# Patient Record
Sex: MALE | Race: WHITE | NOT HISPANIC OR LATINO
[De-identification: names, ages, dates, MRNs, and addresses within clinical notes are randomized per-mention and may not be internally consistent; named-entity substitution may affect disease eponyms.]

---

## 2017-09-11 ENCOUNTER — APPOINTMENT (OUTPATIENT)
Dept: NEPHROLOGY | Facility: CLINIC | Age: 42
End: 2017-09-11
Payer: MEDICAID

## 2017-09-11 VITALS
HEART RATE: 70 BPM | DIASTOLIC BLOOD PRESSURE: 90 MMHG | WEIGHT: 240 LBS | HEIGHT: 66 IN | BODY MASS INDEX: 38.57 KG/M2 | SYSTOLIC BLOOD PRESSURE: 160 MMHG

## 2017-09-11 DIAGNOSIS — I10 ESSENTIAL (PRIMARY) HYPERTENSION: ICD-10-CM

## 2017-09-11 DIAGNOSIS — Z80.0 FAMILY HISTORY OF MALIGNANT NEOPLASM OF DIGESTIVE ORGANS: ICD-10-CM

## 2017-09-11 DIAGNOSIS — R80.9 PROTEINURIA, UNSPECIFIED: ICD-10-CM

## 2017-09-11 DIAGNOSIS — N18.9 CHRONIC KIDNEY DISEASE, UNSPECIFIED: ICD-10-CM

## 2017-09-11 DIAGNOSIS — Z83.3 FAMILY HISTORY OF DIABETES MELLITUS: ICD-10-CM

## 2017-09-11 DIAGNOSIS — F15.90 OTHER STIMULANT USE, UNSPECIFIED, UNCOMPLICATED: ICD-10-CM

## 2017-09-11 DIAGNOSIS — Z87.891 PERSONAL HISTORY OF NICOTINE DEPENDENCE: ICD-10-CM

## 2017-09-11 DIAGNOSIS — N17.9 ACUTE KIDNEY FAILURE, UNSPECIFIED: ICD-10-CM

## 2017-09-11 DIAGNOSIS — Z82.49 FAMILY HISTORY OF ISCHEMIC HEART DISEASE AND OTHER DISEASES OF THE CIRCULATORY SYSTEM: ICD-10-CM

## 2017-09-11 DIAGNOSIS — Z87.448 PERSONAL HISTORY OF OTHER DISEASES OF URINARY SYSTEM: ICD-10-CM

## 2017-09-11 PROCEDURE — 99204 OFFICE O/P NEW MOD 45 MIN: CPT

## 2017-09-11 RX ORDER — NICOTINE TRANSDERMAL SYSTEM 14 MG/24H
14 PATCH, EXTENDED RELEASE TRANSDERMAL
Qty: 7 | Refills: 0 | Status: ACTIVE | COMMUNITY
Start: 2017-06-11

## 2017-09-11 RX ORDER — ASPIRIN ENTERIC COATED TABLETS 81 MG 81 MG/1
81 TABLET, DELAYED RELEASE ORAL
Qty: 30 | Refills: 0 | Status: ACTIVE | COMMUNITY
Start: 2017-07-20

## 2017-09-11 RX ORDER — MINOXIDIL 2.5 MG/1
2.5 TABLET ORAL
Qty: 120 | Refills: 0 | Status: ACTIVE | COMMUNITY
Start: 2017-08-08

## 2017-09-11 RX ORDER — ATORVASTATIN CALCIUM 10 MG/1
10 TABLET, FILM COATED ORAL
Qty: 30 | Refills: 0 | Status: ACTIVE | COMMUNITY
Start: 2017-07-20

## 2017-09-11 RX ORDER — AMLODIPINE BESYLATE 10 MG/1
10 TABLET ORAL
Qty: 30 | Refills: 0 | Status: ACTIVE | COMMUNITY
Start: 2017-07-11

## 2017-09-11 RX ORDER — LABETALOL HYDROCHLORIDE 200 MG/1
200 TABLET, FILM COATED ORAL
Qty: 240 | Refills: 0 | Status: ACTIVE | COMMUNITY
Start: 2017-07-20

## 2017-09-11 RX ORDER — SPIRONOLACTONE 100 MG/1
100 TABLET ORAL
Qty: 90 | Refills: 0 | Status: DISCONTINUED | COMMUNITY
Start: 2017-06-11

## 2017-09-11 RX ORDER — FUROSEMIDE 40 MG/1
40 TABLET ORAL DAILY
Qty: 90 | Refills: 2 | Status: ACTIVE | COMMUNITY
Start: 2017-07-20

## 2017-09-11 RX ORDER — TERAZOSIN 5 MG/1
5 CAPSULE ORAL
Refills: 0 | Status: ACTIVE | COMMUNITY
Start: 2017-06-11

## 2017-09-11 RX ORDER — SPIRONOLACTONE 25 MG/1
25 TABLET ORAL
Qty: 30 | Refills: 0 | Status: ACTIVE | COMMUNITY
Start: 2017-08-10

## 2025-05-05 ENCOUNTER — APPOINTMENT (OUTPATIENT)
Dept: INTERNAL MEDICINE | Facility: CLINIC | Age: 50
End: 2025-05-05
Payer: COMMERCIAL

## 2025-05-05 ENCOUNTER — NON-APPOINTMENT (OUTPATIENT)
Age: 50
End: 2025-05-05

## 2025-05-05 ENCOUNTER — LABORATORY RESULT (OUTPATIENT)
Age: 50
End: 2025-05-05

## 2025-05-05 VITALS
HEART RATE: 88 BPM | HEIGHT: 66 IN | BODY MASS INDEX: 42.59 KG/M2 | DIASTOLIC BLOOD PRESSURE: 147 MMHG | TEMPERATURE: 97.7 F | OXYGEN SATURATION: 99 % | WEIGHT: 265 LBS | SYSTOLIC BLOOD PRESSURE: 213 MMHG

## 2025-05-05 VITALS — SYSTOLIC BLOOD PRESSURE: 223 MMHG | DIASTOLIC BLOOD PRESSURE: 147 MMHG

## 2025-05-05 VITALS — SYSTOLIC BLOOD PRESSURE: 192 MMHG | DIASTOLIC BLOOD PRESSURE: 108 MMHG

## 2025-05-05 DIAGNOSIS — Z00.00 ENCOUNTER FOR GENERAL ADULT MEDICAL EXAMINATION W/OUT ABNORMAL FINDINGS: ICD-10-CM

## 2025-05-05 DIAGNOSIS — R60.0 LOCALIZED EDEMA: ICD-10-CM

## 2025-05-05 DIAGNOSIS — F17.210 NICOTINE DEPENDENCE, CIGARETTES, UNCOMPLICATED: ICD-10-CM

## 2025-05-05 DIAGNOSIS — Z12.11 ENCOUNTER FOR SCREENING FOR MALIGNANT NEOPLASM OF COLON: ICD-10-CM

## 2025-05-05 DIAGNOSIS — I51.7 CARDIOMEGALY: ICD-10-CM

## 2025-05-05 DIAGNOSIS — R06.02 SHORTNESS OF BREATH: ICD-10-CM

## 2025-05-05 PROCEDURE — 99406 BEHAV CHNG SMOKING 3-10 MIN: CPT | Mod: 25

## 2025-05-05 PROCEDURE — 93000 ELECTROCARDIOGRAM COMPLETE: CPT

## 2025-05-05 PROCEDURE — 99203 OFFICE O/P NEW LOW 30 MIN: CPT | Mod: 25

## 2025-05-05 PROCEDURE — 99386 PREV VISIT NEW AGE 40-64: CPT

## 2025-05-05 RX ORDER — NIFEDIPINE 60 MG/1
60 TABLET, EXTENDED RELEASE ORAL
Qty: 180 | Refills: 3 | Status: ACTIVE | COMMUNITY
Start: 2025-05-05 | End: 1900-01-01

## 2025-05-05 RX ORDER — ATENOLOL 100 MG/1
100 TABLET ORAL
Qty: 90 | Refills: 1 | Status: ACTIVE | COMMUNITY
Start: 2025-05-05 | End: 1900-01-01

## 2025-05-05 RX ORDER — NIFEDIPINE 30 MG
1 TABLET, EXTENDED RELEASE 24 HR ORAL
Refills: 0 | DISCHARGE
Start: 2025-05-05

## 2025-05-05 RX ORDER — ASPIRIN 81 MG/1
81 TABLET, CHEWABLE ORAL
Qty: 90 | Refills: 3 | Status: ACTIVE | COMMUNITY
Start: 2025-05-05 | End: 1900-01-01

## 2025-05-05 RX ORDER — DOXAZOSIN 4 MG/1
4 TABLET ORAL
Qty: 30 | Refills: 0 | Status: ACTIVE | COMMUNITY
Start: 2025-05-05 | End: 1900-01-01

## 2025-05-09 LAB
25(OH)D3 SERPL-MCNC: 14 NG/ML
ALBUMIN SERPL ELPH-MCNC: 4 G/DL
ALP BLD-CCNC: 185 U/L
ALT SERPL-CCNC: 15 U/L
ANION GAP SERPL CALC-SCNC: 19 MMOL/L
APPEARANCE: CLEAR
AST SERPL-CCNC: 15 U/L
BASOPHILS # BLD AUTO: 0.05 K/UL
BASOPHILS NFR BLD AUTO: 0.5 %
BILIRUB SERPL-MCNC: <0.2 MG/DL
BILIRUBIN URINE: NEGATIVE
BLOOD URINE: ABNORMAL
BUN SERPL-MCNC: 76 MG/DL
CALCIUM SERPL-MCNC: 7.5 MG/DL
CHLORIDE SERPL-SCNC: 111 MMOL/L
CHOLEST SERPL-MCNC: 151 MG/DL
CO2 SERPL-SCNC: 15 MMOL/L
COLOR: YELLOW
CREAT SERPL-MCNC: 5.9 MG/DL
CREAT SPEC-SCNC: 24 MG/DL
EGFRCR SERPLBLD CKD-EPI 2021: 11 ML/MIN/1.73M2
EOSINOPHIL # BLD AUTO: 0.09 K/UL
EOSINOPHIL NFR BLD AUTO: 0.8 %
ESTIMATED AVERAGE GLUCOSE: 114 MG/DL
GLUCOSE QUALITATIVE U: 100 MG/DL
GLUCOSE SERPL-MCNC: 94 MG/DL
HBA1C MFR BLD HPLC: 5.6 %
HCT VFR BLD CALC: 35.7 %
HDLC SERPL-MCNC: 36 MG/DL
HGB BLD-MCNC: 10.9 G/DL
IMM GRANULOCYTES NFR BLD AUTO: 0.5 %
KETONES URINE: NEGATIVE MG/DL
LDLC SERPL-MCNC: 96 MG/DL
LEUKOCYTE ESTERASE URINE: NEGATIVE
LYMPHOCYTES # BLD AUTO: 1.89 K/UL
LYMPHOCYTES NFR BLD AUTO: 17.7 %
MAN DIFF?: NORMAL
MCHC RBC-ENTMCNC: 28.2 PG
MCHC RBC-ENTMCNC: 30.5 G/DL
MCV RBC AUTO: 92.2 FL
MICROALBUMIN 24H UR DL<=1MG/L-MCNC: 74 MG/DL
MICROALBUMIN/CREAT 24H UR-RTO: 3098 MG/G
MONOCYTES # BLD AUTO: 0.59 K/UL
MONOCYTES NFR BLD AUTO: 5.5 %
NEUTROPHILS # BLD AUTO: 7.99 K/UL
NEUTROPHILS NFR BLD AUTO: 75 %
NITRITE URINE: NEGATIVE
NONHDLC SERPL-MCNC: 115 MG/DL
NT-PROBNP SERPL-MCNC: 1298 PG/ML
PH URINE: 6.5
PLATELET # BLD AUTO: 198 K/UL
POTASSIUM SERPL-SCNC: 5.4 MMOL/L
PROT SERPL-MCNC: 6.8 G/DL
PROTEIN URINE: 100 MG/DL
PSA SERPL-MCNC: 0.33 NG/ML
RBC # BLD: 3.87 M/UL
RBC # FLD: 16.4 %
SODIUM SERPL-SCNC: 145 MMOL/L
SPECIFIC GRAVITY URINE: 1.01
TRIGL SERPL-MCNC: 100 MG/DL
TSH SERPL-ACNC: 2.71 UIU/ML
UROBILINOGEN URINE: 0.2 MG/DL
VIT B12 SERPL-MCNC: 582 PG/ML
WBC # FLD AUTO: 10.66 K/UL

## 2025-05-09 RX ORDER — SODIUM ZIRCONIUM CYCLOSILICATE 5 G/5G
5 POWDER, FOR SUSPENSION ORAL
Qty: 1 | Refills: 1 | Status: ACTIVE | COMMUNITY
Start: 2025-05-09 | End: 1900-01-01

## 2025-05-14 ENCOUNTER — APPOINTMENT (OUTPATIENT)
Dept: INTERNAL MEDICINE | Facility: CLINIC | Age: 50
End: 2025-05-14
Payer: COMMERCIAL

## 2025-05-14 VITALS
OXYGEN SATURATION: 99 % | SYSTOLIC BLOOD PRESSURE: 177 MMHG | BODY MASS INDEX: 42.27 KG/M2 | HEIGHT: 66 IN | WEIGHT: 263 LBS | TEMPERATURE: 97.7 F | HEART RATE: 74 BPM | DIASTOLIC BLOOD PRESSURE: 124 MMHG

## 2025-05-14 VITALS — SYSTOLIC BLOOD PRESSURE: 144 MMHG | DIASTOLIC BLOOD PRESSURE: 102 MMHG

## 2025-05-14 VITALS — SYSTOLIC BLOOD PRESSURE: 154 MMHG | DIASTOLIC BLOOD PRESSURE: 101 MMHG

## 2025-05-14 DIAGNOSIS — I10 ESSENTIAL (PRIMARY) HYPERTENSION: ICD-10-CM

## 2025-05-14 DIAGNOSIS — N18.9 CHRONIC KIDNEY DISEASE, UNSPECIFIED: ICD-10-CM

## 2025-05-14 DIAGNOSIS — D64.9 ANEMIA, UNSPECIFIED: ICD-10-CM

## 2025-05-14 PROCEDURE — 36415 COLL VENOUS BLD VENIPUNCTURE: CPT

## 2025-05-14 PROCEDURE — G2211 COMPLEX E/M VISIT ADD ON: CPT | Mod: NC

## 2025-05-14 PROCEDURE — 99214 OFFICE O/P EST MOD 30 MIN: CPT

## 2025-05-22 DIAGNOSIS — N18.6 END STAGE RENAL DISEASE: ICD-10-CM

## 2025-05-22 LAB
ANION GAP SERPL CALC-SCNC: 19 MMOL/L
BASOPHILS # BLD AUTO: 0.03 K/UL
BASOPHILS NFR BLD AUTO: 0.3 %
BUN SERPL-MCNC: 89 MG/DL
CALCIUM SERPL-MCNC: 7.5 MG/DL
CHLORIDE SERPL-SCNC: 108 MMOL/L
CO2 SERPL-SCNC: 15 MMOL/L
CREAT SERPL-MCNC: 6.91 MG/DL
EGFRCR SERPLBLD CKD-EPI 2021: 9 ML/MIN/1.73M2
EOSINOPHIL # BLD AUTO: 0.01 K/UL
EOSINOPHIL NFR BLD AUTO: 0.1 %
FERRITIN SERPL-MCNC: 228 NG/ML
GLUCOSE SERPL-MCNC: 87 MG/DL
HCT VFR BLD CALC: 33.7 %
HGB BLD-MCNC: 10.3 G/DL
IMM GRANULOCYTES NFR BLD AUTO: 0.6 %
IRON SATN MFR SERPL: 17 %
IRON SERPL-MCNC: 49 UG/DL
LYMPHOCYTES # BLD AUTO: 1.94 K/UL
LYMPHOCYTES NFR BLD AUTO: 17.4 %
MAN DIFF?: NORMAL
MCHC RBC-ENTMCNC: 27.9 PG
MCHC RBC-ENTMCNC: 30.6 G/DL
MCV RBC AUTO: 91.3 FL
MONOCYTES # BLD AUTO: 0.48 K/UL
MONOCYTES NFR BLD AUTO: 4.3 %
NEUTROPHILS # BLD AUTO: 8.64 K/UL
NEUTROPHILS NFR BLD AUTO: 77.3 %
PLATELET # BLD AUTO: 198 K/UL
POTASSIUM SERPL-SCNC: 5.5 MMOL/L
RBC # BLD: 3.69 M/UL
RBC # FLD: 15.9 %
SODIUM SERPL-SCNC: 142 MMOL/L
T(9;22)(ABL1,BCR)/CONTROL BLD/T: NORMAL
TIBC SERPL-MCNC: 289 UG/DL
UIBC SERPL-MCNC: 240 UG/DL
WBC # FLD AUTO: 11.17 K/UL

## 2025-06-19 ENCOUNTER — APPOINTMENT (OUTPATIENT)
Dept: RADIOLOGY | Facility: CLINIC | Age: 50
End: 2025-06-19
Payer: COMMERCIAL

## 2025-06-19 ENCOUNTER — APPOINTMENT (OUTPATIENT)
Dept: ULTRASOUND IMAGING | Facility: CLINIC | Age: 50
End: 2025-06-19
Payer: COMMERCIAL

## 2025-06-19 PROCEDURE — 93971 EXTREMITY STUDY: CPT | Mod: LT

## 2025-06-19 PROCEDURE — 71046 X-RAY EXAM CHEST 2 VIEWS: CPT

## 2025-06-23 ENCOUNTER — INPATIENT (INPATIENT)
Facility: HOSPITAL | Age: 50
LOS: 1 days | Discharge: HOME | DRG: 684 | End: 2025-06-25
Attending: STUDENT IN AN ORGANIZED HEALTH CARE EDUCATION/TRAINING PROGRAM | Admitting: STUDENT IN AN ORGANIZED HEALTH CARE EDUCATION/TRAINING PROGRAM
Payer: COMMERCIAL

## 2025-06-23 ENCOUNTER — NON-APPOINTMENT (OUTPATIENT)
Age: 50
End: 2025-06-23

## 2025-06-23 ENCOUNTER — APPOINTMENT (OUTPATIENT)
Dept: CARDIOLOGY | Facility: CLINIC | Age: 50
End: 2025-06-23
Payer: COMMERCIAL

## 2025-06-23 VITALS
DIASTOLIC BLOOD PRESSURE: 90 MMHG | WEIGHT: 270 LBS | HEART RATE: 115 BPM | BODY MASS INDEX: 43.58 KG/M2 | SYSTOLIC BLOOD PRESSURE: 140 MMHG | OXYGEN SATURATION: 97 %

## 2025-06-23 VITALS
WEIGHT: 270.07 LBS | TEMPERATURE: 97 F | HEART RATE: 116 BPM | HEIGHT: 66 IN | DIASTOLIC BLOOD PRESSURE: 96 MMHG | SYSTOLIC BLOOD PRESSURE: 153 MMHG | RESPIRATION RATE: 20 BRPM | OXYGEN SATURATION: 100 %

## 2025-06-23 DIAGNOSIS — E87.20 ACIDOSIS, UNSPECIFIED: ICD-10-CM

## 2025-06-23 DIAGNOSIS — Z98.890 OTHER SPECIFIED POSTPROCEDURAL STATES: Chronic | ICD-10-CM

## 2025-06-23 DIAGNOSIS — I10 ESSENTIAL (PRIMARY) HYPERTENSION: ICD-10-CM

## 2025-06-23 DIAGNOSIS — R60.0 LOCALIZED EDEMA: ICD-10-CM

## 2025-06-23 DIAGNOSIS — N17.9 ACUTE KIDNEY FAILURE, UNSPECIFIED: ICD-10-CM

## 2025-06-23 DIAGNOSIS — R06.09 OTHER FORMS OF DYSPNEA: ICD-10-CM

## 2025-06-23 DIAGNOSIS — E89.6 POSTPROCEDURAL ADRENOCORTICAL (-MEDULLARY) HYPOFUNCTION: Chronic | ICD-10-CM

## 2025-06-23 DIAGNOSIS — Z29.9 ENCOUNTER FOR PROPHYLACTIC MEASURES, UNSPECIFIED: ICD-10-CM

## 2025-06-23 LAB
ADD ON TEST-SPECIMEN IN LAB: SIGNIFICANT CHANGE UP
ALBUMIN SERPL ELPH-MCNC: 4.3 G/DL — SIGNIFICANT CHANGE UP (ref 3.3–5)
ALP SERPL-CCNC: 181 U/L — HIGH (ref 40–120)
ALT FLD-CCNC: 15 U/L — SIGNIFICANT CHANGE UP (ref 10–45)
ANION GAP SERPL CALC-SCNC: 20 MMOL/L — HIGH (ref 5–17)
APPEARANCE UR: CLEAR — SIGNIFICANT CHANGE UP
AST SERPL-CCNC: 13 U/L — SIGNIFICANT CHANGE UP (ref 10–40)
BACTERIA # UR AUTO: NEGATIVE /HPF — SIGNIFICANT CHANGE UP
BASOPHILS # BLD AUTO: 0.03 K/UL — SIGNIFICANT CHANGE UP (ref 0–0.2)
BASOPHILS NFR BLD AUTO: 0.3 % — SIGNIFICANT CHANGE UP (ref 0–2)
BILIRUB SERPL-MCNC: 0.2 MG/DL — SIGNIFICANT CHANGE UP (ref 0.2–1.2)
BILIRUB UR-MCNC: NEGATIVE — SIGNIFICANT CHANGE UP
BUN SERPL-MCNC: 95 MG/DL — HIGH (ref 7–23)
CALCIUM SERPL-MCNC: 7.6 MG/DL — LOW (ref 8.4–10.5)
CAST: 2 /LPF — SIGNIFICANT CHANGE UP (ref 0–4)
CHLORIDE SERPL-SCNC: 109 MMOL/L — HIGH (ref 96–108)
CO2 SERPL-SCNC: 14 MMOL/L — LOW (ref 22–31)
COLOR SPEC: YELLOW — SIGNIFICANT CHANGE UP
CREAT SERPL-MCNC: 7.12 MG/DL — HIGH (ref 0.5–1.3)
DIFF PNL FLD: ABNORMAL
EGFR: 9 ML/MIN/1.73M2 — LOW
EGFR: 9 ML/MIN/1.73M2 — LOW
EOSINOPHIL # BLD AUTO: 0.09 K/UL — SIGNIFICANT CHANGE UP (ref 0–0.5)
EOSINOPHIL NFR BLD AUTO: 1 % — SIGNIFICANT CHANGE UP (ref 0–6)
GAS PNL BLDV: SIGNIFICANT CHANGE UP
GLUCOSE SERPL-MCNC: 102 MG/DL — HIGH (ref 70–99)
GLUCOSE UR QL: 100 MG/DL
HCT VFR BLD CALC: 34 % — LOW (ref 39–50)
HGB BLD-MCNC: 10.5 G/DL — LOW (ref 13–17)
IMM GRANULOCYTES # BLD AUTO: 0.04 K/UL — SIGNIFICANT CHANGE UP (ref 0–0.07)
IMM GRANULOCYTES NFR BLD AUTO: 0.4 % — SIGNIFICANT CHANGE UP (ref 0–0.9)
KETONES UR QL: NEGATIVE MG/DL — SIGNIFICANT CHANGE UP
LEUKOCYTE ESTERASE UR-ACNC: NEGATIVE — SIGNIFICANT CHANGE UP
LIDOCAIN IGE QN: 165 U/L — HIGH (ref 7–60)
LYMPHOCYTES # BLD AUTO: 1.75 K/UL — SIGNIFICANT CHANGE UP (ref 1–3.3)
LYMPHOCYTES NFR BLD AUTO: 19.1 % — SIGNIFICANT CHANGE UP (ref 13–44)
MAGNESIUM SERPL-MCNC: 2 MG/DL — SIGNIFICANT CHANGE UP (ref 1.6–2.6)
MCHC RBC-ENTMCNC: 27.9 PG — SIGNIFICANT CHANGE UP (ref 27–34)
MCHC RBC-ENTMCNC: 30.9 G/DL — LOW (ref 32–36)
MCV RBC AUTO: 90.4 FL — SIGNIFICANT CHANGE UP (ref 80–100)
MONOCYTES # BLD AUTO: 0.73 K/UL — SIGNIFICANT CHANGE UP (ref 0–0.9)
MONOCYTES NFR BLD AUTO: 8 % — SIGNIFICANT CHANGE UP (ref 2–14)
NEUTROPHILS # BLD AUTO: 6.52 K/UL — SIGNIFICANT CHANGE UP (ref 1.8–7.4)
NEUTROPHILS NFR BLD AUTO: 71.2 % — SIGNIFICANT CHANGE UP (ref 43–77)
NITRITE UR-MCNC: NEGATIVE — SIGNIFICANT CHANGE UP
NRBC # BLD AUTO: 0 K/UL — SIGNIFICANT CHANGE UP (ref 0–0)
NRBC # FLD: 0 K/UL — SIGNIFICANT CHANGE UP (ref 0–0)
NRBC BLD AUTO-RTO: 0 /100 WBCS — SIGNIFICANT CHANGE UP (ref 0–0)
NT-PROBNP SERPL-SCNC: 1293 PG/ML — HIGH (ref 0–300)
PH UR: 5.5 — SIGNIFICANT CHANGE UP (ref 5–8)
PLATELET # BLD AUTO: 170 K/UL — SIGNIFICANT CHANGE UP (ref 150–400)
PMV BLD: 10.9 FL — SIGNIFICANT CHANGE UP (ref 7–13)
POTASSIUM SERPL-MCNC: 4.8 MMOL/L — SIGNIFICANT CHANGE UP (ref 3.5–5.3)
POTASSIUM SERPL-SCNC: 4.8 MMOL/L — SIGNIFICANT CHANGE UP (ref 3.5–5.3)
PROCALCITONIN SERPL-MCNC: 0.37 NG/ML — HIGH (ref 0.02–0.1)
PROT SERPL-MCNC: 7.7 G/DL — SIGNIFICANT CHANGE UP (ref 6–8.3)
PROT UR-MCNC: 100 MG/DL
RBC # BLD: 3.76 M/UL — LOW (ref 4.2–5.8)
RBC # FLD: 15.4 % — HIGH (ref 10.3–14.5)
RBC CASTS # UR COMP ASSIST: 0 /HPF — SIGNIFICANT CHANGE UP (ref 0–4)
SODIUM SERPL-SCNC: 143 MMOL/L — SIGNIFICANT CHANGE UP (ref 135–145)
SP GR SPEC: 1.01 — SIGNIFICANT CHANGE UP (ref 1–1.03)
SQUAMOUS # UR AUTO: 0 /HPF — SIGNIFICANT CHANGE UP (ref 0–5)
TROPONIN T, HIGH SENSITIVITY RESULT: 45 NG/L — SIGNIFICANT CHANGE UP (ref 0–51)
TROPONIN T, HIGH SENSITIVITY RESULT: 49 NG/L — SIGNIFICANT CHANGE UP (ref 0–51)
UROBILINOGEN FLD QL: 0.2 MG/DL — SIGNIFICANT CHANGE UP (ref 0.2–1)
WBC # BLD: 9.16 K/UL — SIGNIFICANT CHANGE UP (ref 3.8–10.5)
WBC # FLD AUTO: 9.16 K/UL — SIGNIFICANT CHANGE UP (ref 3.8–10.5)
WBC UR QL: 0 /HPF — SIGNIFICANT CHANGE UP (ref 0–5)

## 2025-06-23 PROCEDURE — G0537: CPT

## 2025-06-23 PROCEDURE — 76770 US EXAM ABDO BACK WALL COMP: CPT | Mod: 26

## 2025-06-23 PROCEDURE — 99401 PREV MED CNSL INDIV APPRX 15: CPT

## 2025-06-23 PROCEDURE — 71046 X-RAY EXAM CHEST 2 VIEWS: CPT | Mod: 26

## 2025-06-23 PROCEDURE — 99223 1ST HOSP IP/OBS HIGH 75: CPT | Mod: GC

## 2025-06-23 PROCEDURE — 99222 1ST HOSP IP/OBS MODERATE 55: CPT | Mod: GC

## 2025-06-23 PROCEDURE — 93000 ELECTROCARDIOGRAM COMPLETE: CPT

## 2025-06-23 PROCEDURE — 99205 OFFICE O/P NEW HI 60 MIN: CPT | Mod: 25

## 2025-06-23 PROCEDURE — 99285 EMERGENCY DEPT VISIT HI MDM: CPT

## 2025-06-23 RX ORDER — LABETALOL HYDROCHLORIDE 200 MG/1
5 TABLET, FILM COATED ORAL ONCE
Refills: 0 | Status: COMPLETED | OUTPATIENT
Start: 2025-06-23 | End: 2025-06-23

## 2025-06-23 RX ORDER — LISINOPRIL 30 MG/1
100 TABLET ORAL DAILY
Refills: 0 | Status: DISCONTINUED | OUTPATIENT
Start: 2025-06-23 | End: 2025-06-24

## 2025-06-23 RX ORDER — CITRIC ACID/SODIUM CITRATE 300-500 MG
60 SOLUTION, ORAL ORAL THREE TIMES A DAY
Refills: 0 | Status: DISCONTINUED | OUTPATIENT
Start: 2025-06-23 | End: 2025-06-25

## 2025-06-23 RX ORDER — ASPIRIN 325 MG
1 TABLET ORAL
Refills: 0 | DISCHARGE

## 2025-06-23 RX ORDER — CITRIC ACID/SODIUM CITRATE 300-500 MG
30 SOLUTION, ORAL ORAL THREE TIMES A DAY
Refills: 0 | Status: DISCONTINUED | OUTPATIENT
Start: 2025-06-23 | End: 2025-06-23

## 2025-06-23 RX ORDER — NIFEDIPINE 30 MG
60 TABLET, EXTENDED RELEASE 24 HR ORAL
Refills: 0 | Status: DISCONTINUED | OUTPATIENT
Start: 2025-06-23 | End: 2025-06-25

## 2025-06-23 RX ORDER — HEPARIN SODIUM 1000 [USP'U]/ML
5000 INJECTION INTRAVENOUS; SUBCUTANEOUS EVERY 8 HOURS
Refills: 0 | Status: DISCONTINUED | OUTPATIENT
Start: 2025-06-23 | End: 2025-06-25

## 2025-06-23 RX ORDER — NICOTINE POLACRILEX 4 MG/1
1 GUM, CHEWING ORAL ONCE
Refills: 0 | Status: COMPLETED | OUTPATIENT
Start: 2025-06-23 | End: 2025-06-23

## 2025-06-23 RX ORDER — SODIUM CHLORIDE 9 G/1000ML
500 INJECTION, SOLUTION INTRAVENOUS ONCE
Refills: 0 | Status: COMPLETED | OUTPATIENT
Start: 2025-06-23 | End: 2025-06-23

## 2025-06-23 RX ORDER — ACETAMINOPHEN 500 MG/5ML
650 LIQUID (ML) ORAL EVERY 6 HOURS
Refills: 0 | Status: DISCONTINUED | OUTPATIENT
Start: 2025-06-23 | End: 2025-06-25

## 2025-06-23 RX ADMIN — Medication 60 MILLILITER(S): at 23:36

## 2025-06-23 RX ADMIN — Medication 650 MILLIGRAM(S): at 17:28

## 2025-06-23 RX ADMIN — Medication 60 MILLIGRAM(S): at 20:02

## 2025-06-23 RX ADMIN — SODIUM CHLORIDE 500 MILLILITER(S): 9 INJECTION, SOLUTION INTRAVENOUS at 13:10

## 2025-06-23 RX ADMIN — Medication 60 MILLILITER(S): at 17:42

## 2025-06-23 NOTE — H&P ADULT - TIME BILLING
chart review; lab review; imaging review; discussion with patient and family about plan of care and updates

## 2025-06-23 NOTE — ED PROVIDER NOTE - OBJECTIVE STATEMENT
30-year-old male with history of chronic kidney disease sent in from his cardiologist Dr. Guillen for worsening left lower extremity swelling and redness.  Patient has been dealing with this now for several weeks.  States both legs are slightly swollen however left greater than right.  Had a negative duplex done on 6/19 as well as a normal chest x-ray that was performed at that time.  Patient was seen by his cardiologist today that recommended he come to the emergency department for further evaluation.  Denies any pain in the lower extremity swelling.  Redness and warmth have increased as well.  No fevers no chills.  Normal bowel bladder habits.  No runny nose sore throat or cough.  Patient denies any chest pain but is noting some exertional dyspnea.  Has recently discontinued off his Lasix after his creatinine increased and they have been working that up. 50-year-old male with history of chronic kidney disease sent in from his cardiologist Dr. Guillen for worsening left lower extremity swelling and redness.  Patient has been dealing with this now for several weeks.  States both legs are slightly swollen however left greater than right.  Had a negative duplex done on 6/19 as well as a normal chest x-ray that was performed at that time.  Patient was seen by his cardiologist today that recommended he come to the emergency department for further evaluation.  Denies any pain in the lower extremity swelling.  Redness and warmth have increased as well.  No fevers no chills.  Normal bowel bladder habits.  No runny nose sore throat or cough.  Patient denies any chest pain but is noting some exertional dyspnea.  Has recently discontinued off his Lasix after his creatinine increased and they have been working that up.

## 2025-06-23 NOTE — CONSULT NOTE ADULT - ATTENDING COMMENTS
# CKD stage V  Serum creatinine in the mid 4s in 2023. He stopped follow up with his nephrologist and medications. In April 2025, he went to PCP's office because of lower extremity swelling. Blood test showed that serum creatinine was 6. Now, serum creatinine is 7.  Please do US kidney and bladder.  Check urine protein:creat ratio and albumin:creatinine ratio.     # HTN  Patient has not been taking his BP meds because he did not have any symptoms. He started resuming nifedipine in April 2025 after seeing his PCP (Dr. Benjamin). He said that his SBP was in the 180s then.     # Metabolic acidosis  - secondary to CKD.   - Start bicitra 60m TID.    # LE swelling  - Chronic since APril 2025.  - US duplex of legs - DVT ruled out.   - Since patient does not have any shortness of breath, we can hold off diuretics for now. We want to monitor his kidney function first.     The rest of the recommendations as per fellow's note.    Estefani Montiel MD  Attending Nephrologist  [phone] or via [employer]

## 2025-06-23 NOTE — ED ADULT NURSE REASSESSMENT NOTE - NS ED NURSE REASSESS COMMENT FT1
attempted to call pharmacy for Bicitra medication. No answer from pharmacy. Will try again to obtain medication.

## 2025-06-23 NOTE — H&P ADULT - NSHPPHYSICALEXAM_GEN_ALL_CORE
T(C): 36.5 (06-23-25 @ 09:22), Max: 36.5 (06-23-25 @ 09:22)  HR: 105 (06-23-25 @ 09:22) (105 - 116)  BP: 146/98 (06-23-25 @ 09:22) (146/98 - 153/96)  RR: 19 (06-23-25 @ 09:22) (19 - 20)  SpO2: 98% (06-23-25 @ 09:22) (98% - 100%)    CONSTITUTIONAL: Well groomed, no apparent distress  EYES: PERRLA and symmetric, EOMI, No conjunctival or scleral injection, non-icteric  ENMT: Oral mucosa with moist membranes.              NECK: Supple, symmetric and without tracheal deviation   RESP: No respiratory distress, no use of accessory muscles; CTA b/l but decreased breath sounds (likely due to body habitus)  CV: RRR, +S1S2, no JVD; + peripheral edema (b/l LE edema)  GI: Soft, NT, ND, no rebound, no guarding;   SKIN: B/l erythema of LE   NEURO: CN II-XII intact;   PSYCH: Appropriate insight/judgment; A+O x 3, mood and affect appropriate, recent/remote memory intact

## 2025-06-23 NOTE — ED PROVIDER NOTE - CARE PLAN
Principal Discharge DX:	ROMAN (acute kidney injury)  Secondary Diagnosis:	H/O metabolic acidosis   1

## 2025-06-23 NOTE — H&P ADULT - HISTORY OF PRESENT ILLNESS
RL is a 50 year old M with PMH of Stage 4 CKD, malignant HTN, LV hypertrophy presents to [facility] referred by his cardiologist Dr. Guillen after pt's appointment today for acute on chronic worsening b/l lower extremity edema and erythema (L>R) and dyspnea on exertion. The LE swelling and erythema has been ongoing for 1 week but has acutely worsened in the past few days. He endorses tightness of b/l LE, dyspnea on exertion, sleeping sitting up (but due to shoulder surgery), and adequate urine production. He denies leg pain/parethesia, acute weight gain, chest pain, dizziness, n/v, and dysuria.    Of note he had self d/romelia most of his medication for a few months including Lasix. He has only been taking [id number], Atenolol 100mg and Nifedipine ER 60mg if BP >160/90. His home AM BP readings measure around 160/95.   Nephrologist (Dr. Juan LOPEZ) was last seen 1yr ago where his Cr was 4.1. He has a hx of L adrenal resection for HTN with renal bx (pathology results unknown by pt). BP improved after resection but pt remains on BP meds for BP control.     Patient presented to ED on 6/19/25 also for LE edema and erythema and dyspnea upon exertion. Obtained LLE Duplex US which showed no evidence of DVT.    In the ED, pt is afebrile, tachycardic (105-116), hypertensive (146/98), tachypneic (20), and satting @100% on RA. EKG showed sinus tachycardia. CXR showed clear lungs. Bladder and kidney US obtained. Pro-BNP elevated to 1200s. BUN: Cr was 95:7.12. VBG showed acidosis with pH of 7.17, bicarb 14, and AG to 20. UA showed proteinuria and glucosuria.    RL is a 50 year old M with PMH of Stage IV CKD, malignant HTN, LV hypertrophy presents to [facility] referred by his cardiologist Dr. Guillen after pt's appointment today for acute on chronic worsening b/l lower extremity edema and erythema (L>R) and dyspnea on exertion. The LE swelling and erythema has been ongoing for 1 week but has acutely worsened in the past few days. He endorses tightness of b/l LE, dyspnea on exertion, sleeping sitting up (but due to shoulder surgery), and adequate urine production. He denies leg pain/parethesia, acute weight gain, chest pain, dizziness, n/v, and dysuria.    Of note he had self d/romelia most of his medications for a few months including Lasix. He has only been taking [id number], Atenolol 100mg and Nifedipine ER 60mg if BP >160/90. His home AM BP readings measure around 160/95.   Nephrologist (Dr. Juan LOPEZ) was last seen 1yr ago where his Cr was 4.1. He has a hx of L adrenal resection for HTN with renal bx (pathology results unknown by pt). BP improved after resection but pt remains on BP meds for BP control.     Patient presented to ED on 6/19/25 also for LE edema and erythema and dyspnea upon exertion. Obtained LLE Duplex US which showed no evidence of DVT.    In the ED, pt is afebrile, tachycardic (105-116), hypertensive (146/98), tachypneic (20), and satting @100% on RA. EKG showed sinus tachycardia. CXR showed clear lungs. Bladder and kidney US obtained. Pro-BNP elevated to 1200s. BUN: Cr was 95:7.12. VBG showed acidosis with pH of 7.17, bicarb 14, and AG to 20. UA showed proteinuria and glucosuria.

## 2025-06-23 NOTE — H&P ADULT - PROBLEM SELECTOR PLAN 2
Dyspnea with exertion  - CXR clear  - No crackles on PE  - pro BNP 1293    Plan:  - TTE Dyspnea with exertion  - CXR clear  - No crackles on PE  - pro BNP 1293    Plan:  - F/u TTE

## 2025-06-23 NOTE — CONSULT NOTE ADULT - ASSESSMENT
50M with PMH of CKD, HTN sent for worsening swelling and redness and ROMNA on CKD.         ROMAN on CKD  -Cr in 2017 was 2.1. About one year ago was about 4 (stable for 2 years after ?adrenal surgery), 2 months ago was ~ 6. Pt used to follow with Dr. Abigail Martinez (nephrology) but stopped follow up and stopped taking all meds as felt good.   -Pt endorses nocturia x3/night but also drinks fluids at night.   -In ER labs significant for Cr 7 (6/23), BUN 95, SCO2 14, rest ok  -UA +prtoein  RECS:  -Obtain UPCR  -Obtain dedicated Renal Bladder US  -Start bicitra 60 ml TID  -No diuretics for now, strict I/O      Kalen Coello  Nephrology Fellow  Feel free to contact me on TEAMS  After 5 pm and on weekends please contact the on-call Fellow.   50M with PMH of CKD, HTN sent for worsening swelling and redness and ROMAN on CKD.         ROMAN on CKD  -Cr in 2017 was 2.1. About one year ago was about 4 (stable for 2 years after ?adrenal surgery), 2 months ago was ~ 6. Pt used to follow with Dr. Abigail Martinez (nephrology) but stopped follow up and stopped taking all meds as he felt fine.   -Pt endorses nocturia x3/night but also drinks fluids at night.   -In ER labs significant for Cr 7 (6/23), BUN 95, SCO2 14, rest ok  -UA +prtoein  RECS:  -Obtain UPCR  -Obtain dedicated Renal Bladder US  -Start bicitra 60 ml TID  -No diuretics for now, strict I/O      Kalen Coello  Nephrology Fellow  Feel free to contact me on TEAMS  After 5 pm and on weekends please contact the on-call Fellow.

## 2025-06-23 NOTE — H&P ADULT - ASSESSMENT
RL is a 50 year old M with PMH of Stage 4 CKD, malignant HTN, LV hypertrophy presents to [facility] referred by his cardiologist Dr. Guillen after pt's appointment today for acute on chronic worsening b/l lower extremity edema and erythema (L>R) and dyspnea on exertion. Labs showed elevated proBNP, AG metabolic acidosis, ROMAN on CKD, normal WBC. Presentation is concerning for CHF vs. cellulitis.  RL is a 50 year old M with PMH of Stage 4 CKD, malignant HTN, LV hypertrophy presents to [facility] referred by his cardiologist Dr. Guillen after pt's appointment today for acute on chronic worsening b/l lower extremity edema and erythema (L>R) and dyspnea on exertion. Labs showed elevated proBNP, AG metabolic acidosis, ROMAN on CKD, normal WBC. Presentation is concerning for CHF vs. cellulitis with ROMAN on CKD.

## 2025-06-23 NOTE — CONSULT NOTE ADULT - SUBJECTIVE AND OBJECTIVE BOX
[facility] DIVISION OF KIDNEY DISEASES AND HYPERTENSION -- [phone]  -- INITIAL CONSULT NOTE  --------------------------------------------------------------------------------  HPI:  50M with PMH of CKD, HTN sent for worsening swelling and redness and ROMAN on CKD.     Nephrology consulted for ROMAN on CKD    Baseline Cr 1 year ago was about 4 (stable for 2 years after ?adrenal surgery), 2 months ago was ~ 6. In 2017 Cr was 2.1 per HIE but no labs available in the interim. Pt used to follow with Dr. Abigail Martinez (nephrology) but stopped follow up and stopped taking all meds as felt good.     Pt endorses nocturia x3/night but also drinks fluids at night.     Denies any shortness of breath, chest pain, nausea, vomiting, abdominal pain, diarrhea.    In ER labs significant for Cr 7 (6/23), BUN 95, SCO2 14, rest ok    PAST HISTORY  --------------------------------------------------------------------------------  PAST MEDICAL & SURGICAL HISTORY:    FAMILY HISTORY:    PAST SOCIAL HISTORY:    ALLERGIES & MEDICATIONS  --------------------------------------------------------------------------------  Allergies    No Known Allergies    Intolerances      Standing Inpatient Medications  citric acid/sodium citrate Solution 30 milliLiter(s) Oral three times a day    PRN Inpatient Medications  acetaminophen     Tablet .. 650 milliGRAM(s) Oral every 6 hours PRN      REVIEW OF SYSTEMS  --------------------------------------------------------------------------------    All other systems were reviewed and are negative, except as noted.    VITALS/PHYSICAL EXAM  --------------------------------------------------------------------------------  T(C): 36.5 (06-23-25 @ 09:22), Max: 36.5 (06-23-25 @ 09:22)  HR: 105 (06-23-25 @ 09:22) (105 - 116)  BP: 146/98 (06-23-25 @ 09:22) (146/98 - 153/96)  RR: 19 (06-23-25 @ 09:22) (19 - 20)  SpO2: 98% (06-23-25 @ 09:22) (98% - 100%)  Wt(kg): --  Height (cm): 167.6 (06-23-25 @ 08:53)  Weight (kg): 122.5 (06-23-25 @ 08:53)  BMI (kg/m2): 43.6 (06-23-25 @ 08:53)  BSA (m2): 2.27 (06-23-25 @ 08:53)        Physical Exam:  	Gen: NAD  	HEENT: Anicteric  	Pulm: CTA B/L  	CV: S1S2+  	Abd: Soft, +BS    	Ext: erythema and LE edema B/L  	Neuro: Awake  	Skin: Warm and dry    LABS/STUDIES  --------------------------------------------------------------------------------              10.5   9.16  >-----------<  170      [06-23-25 @ 10:19]              34.0     143  |  109  |  95  ----------------------------<  102      [06-23-25 @ 10:19]  4.8   |  14  |  7.12        Ca     7.6     [06-23-25 @ 10:19]      Mg     2.0     [06-23-25 @ 10:19]    TPro  7.7  /  Alb  4.3  /  TBili  0.2  /  DBili  x   /  AST  13  /  ALT  15  /  AlkPhos  181  [06-23-25 @ 10:19]          Creatinine Trend:  SCr 7.12 [06-23 @ 10:19]    Urinalysis - [06-23-25 @ 10:55]      Color Yellow / Appearance Clear / SG 1.009 / pH 5.5      Gluc 100 / Ketone   / Bili Negative / Urobili 0.2       Blood Trace / Protein 100 / Leuk Est Negative / Nitrite Negative      RBC 0 / WBC 0 / Hyaline  / Gran  / Sq Epi  / Non Sq Epi 0 / Bacteria Negative          Tacrolimus  Cyclosporine  Sirolimus  Mycophenolate  BK PCR  CMV PCR  Parvo PCR  EBV PCR [facility] DIVISION OF KIDNEY DISEASES AND HYPERTENSION -- [phone]  -- INITIAL CONSULT NOTE  --------------------------------------------------------------------------------  HPI:  50M with PMH of CKD, HTN sent for worsening swelling and redness and ROMAN on CKD.     Nephrology consulted for ROMAN on CKD    Baseline Cr about 1 year ago was about 4 (stable for 2 years after ?adrenal surgery), 2 months ago was ~ 6. In 2017 Cr was 2.1 per HIE but no labs available in the interim. Pt used to follow with Dr. Abigail Martinez (nephrology) but stopped follow up and stopped taking all meds as felt good.     Pt endorses nocturia x3/night but also drinks fluids at night.     Denies any shortness of breath, chest pain, nausea, vomiting, abdominal pain, diarrhea.    In ER labs significant for Cr 7 (6/23), BUN 95, SCO2 14, rest ok    PAST HISTORY  --------------------------------------------------------------------------------  PAST MEDICAL & SURGICAL HISTORY:    FAMILY HISTORY:    PAST SOCIAL HISTORY: lives at home    ALLERGIES & MEDICATIONS  --------------------------------------------------------------------------------  Allergies    No Known Allergies    Intolerances      Standing Inpatient Medications  citric acid/sodium citrate Solution 30 milliLiter(s) Oral three times a day    PRN Inpatient Medications  acetaminophen     Tablet .. 650 milliGRAM(s) Oral every 6 hours PRN      REVIEW OF SYSTEMS  --------------------------------------------------------------------------------    All other systems were reviewed and are negative, except as noted.    VITALS/PHYSICAL EXAM  --------------------------------------------------------------------------------  T(C): 36.5 (06-23-25 @ 09:22), Max: 36.5 (06-23-25 @ 09:22)  HR: 105 (06-23-25 @ 09:22) (105 - 116)  BP: 146/98 (06-23-25 @ 09:22) (146/98 - 153/96)  RR: 19 (06-23-25 @ 09:22) (19 - 20)  SpO2: 98% (06-23-25 @ 09:22) (98% - 100%)  Wt(kg): --  Height (cm): 167.6 (06-23-25 @ 08:53)  Weight (kg): 122.5 (06-23-25 @ 08:53)  BMI (kg/m2): 43.6 (06-23-25 @ 08:53)  BSA (m2): 2.27 (06-23-25 @ 08:53)        Physical Exam:  	Gen: NAD  	HEENT: Anicteric  	Pulm: CTA B/L  	CV: S1S2+  	Abd: Soft, +BS    	Ext: erythema and LE edema B/L  	Neuro: Awake  	Skin: Warm and dry    LABS/STUDIES  --------------------------------------------------------------------------------              10.5   9.16  >-----------<  170      [06-23-25 @ 10:19]              34.0     143  |  109  |  95  ----------------------------<  102      [06-23-25 @ 10:19]  4.8   |  14  |  7.12        Ca     7.6     [06-23-25 @ 10:19]      Mg     2.0     [06-23-25 @ 10:19]    TPro  7.7  /  Alb  4.3  /  TBili  0.2  /  DBili  x   /  AST  13  /  ALT  15  /  AlkPhos  181  [06-23-25 @ 10:19]          Creatinine Trend:  SCr 7.12 [06-23 @ 10:19]    Urinalysis - [06-23-25 @ 10:55]      Color Yellow / Appearance Clear / SG 1.009 / pH 5.5      Gluc 100 / Ketone   / Bili Negative / Urobili 0.2       Blood Trace / Protein 100 / Leuk Est Negative / Nitrite Negative      RBC 0 / WBC 0 / Hyaline  / Gran  / Sq Epi  / Non Sq Epi 0 / Bacteria Negative          Tacrolimus  Cyclosporine  Sirolimus  Mycophenolate  BK PCR  CMV PCR  Parvo PCR  EBV PCR

## 2025-06-23 NOTE — ED PROVIDER NOTE - ATTENDING APP SHARED VISIT CONTRIBUTION OF CARE
I, Hernandez Cee MD, Emergency Medicine Attending Physician, personally saw and examined the patient and I personally made/approve the management plan and take responsibility for the patient management.    MDM: 50-year-old male with history of CKD, who presents with left lower extremity swelling and redness.  Patient was sent in by his cardiologist.  Patient has already obtained imaging including duplex ultrasound on 6/19/2025 that showed no evidence of DVT of the left lower extremity.  Patient states he has baseline shortness of breath with exertion and with lying flat which has been present for 2 months.  Patient also with history of CKD, last creatinine of 6, no longer on Lasix 80 mg.  Denies any chest pain or pleuritic symptoms.    ROS: denies trauma, fever, chills, chest pain, abdominal pain, nausea, vomiting, numbness, weakness    On examination, patient with elevated heart rate and blood pressure but otherwise stable vitals, well-appearing, in no acute distress. Cardiac examination RRR, lungs CTAB with no W/R/R.  ABD: Abdomen soft, non-tender and non-distended, no rebound, no guarding, no rigidity. No CVA tenderness.  EXT: (+) Erythema to the anterior aspect of the shin with minimal warmth, nontender, no induration or fluctuance.  There is no calf tenderness. Negative Hanny's sign..  Neurovascularly intact in all 4 extremities.     Will obtain labs to evaluate for hematologic disorder, metabolic derangements, hepatic and renal function, and screen for infection.  Will keep patient on cardiac monitor, obtain EKG and troponin to evaluate for possible cardiac abnormality including ACS and arrhythmia.  Will obtain chest x-ray to evaluate for acute cardiopulmonary pathology.    My independent interpretation of the EKG shows:  Sinus tachycardia with rate of 101 bpm, normal axis, no ST elevations or depressions.  QTc prolonged at 492 I, Hernandez Cee MD, Emergency Medicine Attending Physician, personally saw and examined the patient and I personally made/approve the management plan and take responsibility for the patient management.    MDM: 50-year-old male with history of CKD, who presents with left lower extremity swelling and redness.  Patient was sent in by his cardiologist.  Patient has already obtained imaging including duplex ultrasound on 6/19/2025 that showed no evidence of DVT of the left lower extremity.  Patient states he has baseline shortness of breath with exertion and with lying flat which has been present for 2 months.  Patient also with history of CKD, last creatinine of 6, no longer on Lasix 80 mg.  Denies any chest pain or pleuritic symptoms.    ROS: denies trauma, fever, chills, chest pain, abdominal pain, nausea, vomiting, numbness, weakness    On examination, patient with elevated heart rate and blood pressure but otherwise stable vitals, well-appearing, in no acute distress. Cardiac examination RRR, lungs CTAB with no W/R/R.  ABD: Abdomen soft, non-tender and non-distended, no rebound, no guarding, no rigidity. No CVA tenderness.  EXT: (+) Erythema to the anterior aspect of the shin with minimal warmth, nontender, no induration or fluctuance.  There is no calf tenderness. Negative Hanny's sign..  Neurovascularly intact in all 4 extremities.     Will obtain labs to evaluate for hematologic disorder, metabolic derangements, hepatic and renal function, and screen for infection.  Will keep patient on cardiac monitor, obtain EKG and troponin to evaluate for possible cardiac abnormality including ACS and arrhythmia.  Will obtain chest x-ray to evaluate for acute cardiopulmonary pathology.    My independent interpretation of the EKG shows:  Sinus tachycardia with rate of 101 bpm, normal axis, no ST elevations or depressions.  QTc prolonged at 492    Labs reviewed: CBC showed no leukocytosis, mild anemia. Patient significantly acidotic with pH 7.17 and bicarb 14, with ROMAN on CKD.  Troponin indeterminate range, will obtain repeat.  Elevated proBNP.  Chest x-ray with clear lungs.  Patient was seen by nephrology who recommended Bicitra.  The patient will need to be admitted to the hospital for continued evaluation and management.  Discussed with the accepting physician regarding the initial presentation, diagnostic studies, treatments given in the ED, and current plan of care.  The patient was accepted by and endorsed to the medicine team nephrology consultation.

## 2025-06-23 NOTE — H&P ADULT - NSICDXPASTSURGICALHX_GEN_ALL_CORE_FT
PAST SURGICAL HISTORY:  H/O shoulder surgery     H/O total adrenalectomy     History of carpal tunnel surgery

## 2025-06-23 NOTE — ED ADULT TRIAGE NOTE - CHIEF COMPLAINT QUOTE
Pt c/o "swelling to left leg. Sent by cardiologist due swelling  and PMH elevated Creatine levels. No SOB/CP"

## 2025-06-23 NOTE — ED PROVIDER NOTE - WR ORDER ID 1
Hello,    Please call the following patient with the results below. Thank you!    Miguel Phelps,    I hope you're well. I wanted to communicate with you the results of the tests that we did.     The laboratory results show NO COVID. Please let me know if you have any other questions or concerns.     Thank you!  Jacqueline Mishra MD PGY3   [id number]

## 2025-06-23 NOTE — H&P ADULT - PROBLEM SELECTOR PLAN 1
B/l LE edema and erythema ongoing for a few weeks with acute worsening in the past few days 2 chronic l/l LE edema and erythema since 04/25 with acute worsening in the past few days.    Plan:  - Nephro consulted. Appreciate recs:     >Can hold off on diuretics since no SOB. Monitor renal function first.

## 2025-06-23 NOTE — H&P ADULT - NSHPLABSRESULTS_GEN_ALL_CORE
10.5   9.16  )-----------( 170      ( 23 Jun 2025 10:19 )             34.0   06-23    143  |  109[H]  |  95[H]  ----------------------------<  102[H]  4.8   |  14[L]  |  7.12[H]    Ca    7.6[L]      23 Jun 2025 10:19  Mg     2.0     06-23    TPro  7.7  /  Alb  4.3  /  TBili  0.2  /  DBili  x   /  AST  13  /  ALT  15  /  AlkPhos  181[H]  06-23    CAPILLARY BLOOD GLUCOSE    PROCEDURE DATE:  06/23/2025          INTERPRETATION:  EXAMINATION: XR CHEST PA AND LATERAL    CLINICAL INDICATION: Chest Pain    TECHNIQUE: 2 views; Frontal and lateral views of the chest were obtained.    COMPARISON: 6/19/2025.    FINDINGS:  The heart is normal in size.  The lungs are clear.  There is no pneumothorax or pleural effusion.    IMPRESSION:  Clear lungs.    --- End of Report ---          LAITH CASTILLO MD; Resident Radiologist  This document has been electronically signed.  SAMIR BLACKWELL MD; Attending Radiologist  This document has been electronically signed. Jun 23 2025 12:46PM 10.5   9.16  )-----------( 170      ( 23 Jun 2025 10:19 )             34.0   06-23    143  |  109[H]  |  95[H]  ----------------------------<  102[H]  4.8   |  14[L]  |  7.12[H]    Ca    7.6[L]      23 Jun 2025 10:19  Mg     2.0     06-23    TPro  7.7  /  Alb  4.3  /  TBili  0.2  /  DBili  x   /  AST  13  /  ALT  15  /  AlkPhos  181[H]  06-23    CAPILLARY BLOOD GLUCOSE    PROCEDURE DATE:  06/23/2025          INTERPRETATION:  EXAMINATION: XR CHEST PA AND LATERAL    CLINICAL INDICATION: Chest Pain    TECHNIQUE: 2 views; Frontal and lateral views of the chest were obtained.    COMPARISON: 6/19/2025.    FINDINGS:  The heart is normal in size.  The lungs are clear.  There is no pneumothorax or pleural effusion.    IMPRESSION:  Clear lungs.    --- End of Report ---          LAITH CASTILLO MD; Resident Radiologist  This document has been electronically signed.  SAMIR BLACKWELL MD; Attending Radiologist  This document has been electronically signed. Jun 23 2025 12:46PM    ACC: [id number] EXAM: US KIDNEYS AND BLADDER ORDERED BY: SYMONE SWANSON    PROCEDURE DATE: 06/23/2025        INTERPRETATION: CLINICAL INFORMATION: 50 year old male with chronic kidney disease and acute kidney injury.    COMPARISON: Noncontrast CT abdomen pelvis 5/5/2008    TECHNIQUE: Sonography of the kidneys and bladder.    FINDINGS:  Right kidney: 8.4 cm. Markedly increased parenchymal echogenicity. Mild pelvic fullness. No hydronephrosis. 0.4 x 0.6 x 0.7 cm interpolar cyst.    Left kidney: 7.9 cm. Markedly increased parenchymal echogenicity. No hydronephrosis. 0.8 x 0.9 x 0.9 cm lower pole cyst with low-level internal echoes. 1.0 x 1.0 x 1.8 cm upper pole cyst with low-level internal echoes.    Urinary bladder: Within normal limits.    IMPRESSION:  Markedly increased renal parenchymal echogenicity bilaterally compatible with medical renal disease. Advise clinical correlation.    No hydronephrosis.    Bilateral cysts as above.        --- End of Report ---          CHEYANNE GARCIA MD; Resident Radiologist  This document has been electronically signed.  BENITO MIGUEL MD; Attending Radiologist  This document has been electronically signed. Jun 23 2025 3:41PM

## 2025-06-23 NOTE — H&P ADULT - PROBLEM SELECTOR PLAN 5
Dx of malignant HTN. Self d/romelia multiple BP control meds. Still takes Atenolol 100mg    Plan:   - START Atenolol 100mg QD  - START Nifedipine ER 60mg q12h  - HOLD [id number]  - F/u Hba1c

## 2025-06-23 NOTE — ED ADULT NURSE NOTE - OBJECTIVE STATEMENT
pt 51 yo male hx htn renal disese ptresnts from md office with LLE swelling x over 1 month no recent long trips or flights pt has had elevated creatine in the past sent for iv diretics skin warm dry pt accompanied by wife at bedside denies any chest pain has some mild sob on occasion reported pt is a smoker about 1/2 PPd fpr over 20 years

## 2025-06-23 NOTE — H&P ADULT - ATTENDING COMMENTS
Patient is a 50 year old male, with PMH of Stage 4 CKD, malignant HTN, LV hypertrophy, who presented to [facility] referred by his cardiologist Dr. Guillen for acute on chronic worsening b/l lower extremity edema and erythema (L>R) and dyspnea on exertion. Labs showed elevated proBNP, AG metabolic acidosis, ROMAN on CKD, normal WBC. Presentation is concerning for CHF vs. cellulitis with ROMAN on CKD.    - monitor creatinine daily   - renal consulted; recs appreciated; hold off on diuretics for now; started on bicitra; obtain renal US   - obtain urine PC ratio   - obtain ECHO   - monitor O2 sats; currently on room air  - patient with recent [facility] duplex done negative for DVT; will hold off on repeat duplex at this time  - repeat VBG in AM   - place on BP meds; monitor BP and adjust meds as needed     Discussed with wife at bedside.     Rest as above. Discussed with HS.

## 2025-06-23 NOTE — ED ADULT NURSE REASSESSMENT NOTE - NS ED NURSE REASSESS COMMENT FT1
Pt was informed.  Pt has an appointment scheduled to have US done tomorrow.  Pt is wanting to know if you could place ref. for .  Xiomara is out of office today, that's why Im sending to you per pt request.  Thanks     pt to ultrasound via stretcher

## 2025-06-23 NOTE — H&P ADULT - PROBLEM SELECTOR PLAN 3
Stage 4 CKD. Follows outside nephrologist at [facility] (Dr. Martinez). Pt states he makes adequate urine. No dysuria. UA showed proteinuria and glucosuria.     - BUN:Cr 95:7.1    Plan:  - F/u bladder and kidney US (rule out obstructive nephropathy) Hx of stage IV CKD. Follows outside nephrologist at [facility] (Dr. Martinez). Pt states he makes adequate urine. No dysuria. UA showed proteinuria and glucosuria.     - BUN:Cr 95:7.1    Plan:  - F/u bladder and kidney US (rule out obstructive nephropathy)  - Nephro consulted. Appreciate recs,    > Dx of Stage V CKD    > F/u Protein:Cr Ratio and Albumin:Cr Ratio    > Strict I&Os Hx of stage IV CKD. Follows outside nephrologist at [facility] (Dr. Martinez)-last appt was 1 yr ago. States he makes adequate urine. No dysuria. UA showed proteinuria and glucosuria. Stopped taking medications and following up with previous nephrologist.     - BUN:Cr 95:7.1    Plan:  - F/u bladder and kidney US (rule out obstructive nephropathy)  - Nephro consulted. Appreciate recs,    > Dx of Stage V CKD    > F/u Protein:Cr Ratio and Albumin:Cr Ratio    > Strict I&Os

## 2025-06-24 ENCOUNTER — RESULT REVIEW (OUTPATIENT)
Age: 50
End: 2025-06-24

## 2025-06-24 ENCOUNTER — TRANSCRIPTION ENCOUNTER (OUTPATIENT)
Age: 50
End: 2025-06-24

## 2025-06-24 DIAGNOSIS — E83.51 HYPOCALCEMIA: ICD-10-CM

## 2025-06-24 LAB
A1C WITH ESTIMATED AVERAGE GLUCOSE RESULT: 5.3 % — SIGNIFICANT CHANGE UP (ref 4–5.6)
ALBUMIN SERPL ELPH-MCNC: 4 G/DL — SIGNIFICANT CHANGE UP (ref 3.3–5)
ALP SERPL-CCNC: 164 U/L — HIGH (ref 40–120)
ALT FLD-CCNC: 16 U/L — SIGNIFICANT CHANGE UP (ref 10–45)
ANION GAP SERPL CALC-SCNC: 18 MMOL/L — HIGH (ref 5–17)
AST SERPL-CCNC: 14 U/L — SIGNIFICANT CHANGE UP (ref 10–40)
BILIRUB SERPL-MCNC: 0.2 MG/DL — SIGNIFICANT CHANGE UP (ref 0.2–1.2)
BUN SERPL-MCNC: 87 MG/DL — HIGH (ref 7–23)
CALCIUM SERPL-MCNC: 7.3 MG/DL — LOW (ref 8.4–10.5)
CHLORIDE SERPL-SCNC: 107 MMOL/L — SIGNIFICANT CHANGE UP (ref 96–108)
CHOLEST SERPL-MCNC: 152 MG/DL — SIGNIFICANT CHANGE UP
CO2 SERPL-SCNC: 15 MMOL/L — LOW (ref 22–31)
CREAT ?TM UR-MCNC: 41 MG/DL — SIGNIFICANT CHANGE UP
CREAT SERPL-MCNC: 7.15 MG/DL — HIGH (ref 0.5–1.3)
EGFR: 9 ML/MIN/1.73M2 — LOW
EGFR: 9 ML/MIN/1.73M2 — LOW
ESTIMATED AVERAGE GLUCOSE: 105 MG/DL — SIGNIFICANT CHANGE UP (ref 68–114)
GAS PNL BLDV: SIGNIFICANT CHANGE UP
GLUCOSE SERPL-MCNC: 96 MG/DL — SIGNIFICANT CHANGE UP (ref 70–99)
HCT VFR BLD CALC: 30.9 % — LOW (ref 39–50)
HDLC SERPL-MCNC: 32 MG/DL — LOW
HGB BLD-MCNC: 10 G/DL — LOW (ref 13–17)
LDLC SERPL-MCNC: 89 MG/DL — SIGNIFICANT CHANGE UP
LIPID PNL WITH DIRECT LDL SERPL: 89 MG/DL — SIGNIFICANT CHANGE UP
MAGNESIUM SERPL-MCNC: 1.9 MG/DL — SIGNIFICANT CHANGE UP (ref 1.6–2.6)
MCHC RBC-ENTMCNC: 28.5 PG — SIGNIFICANT CHANGE UP (ref 27–34)
MCHC RBC-ENTMCNC: 32.4 G/DL — SIGNIFICANT CHANGE UP (ref 32–36)
MCV RBC AUTO: 88 FL — SIGNIFICANT CHANGE UP (ref 80–100)
NONHDLC SERPL-MCNC: 120 MG/DL — SIGNIFICANT CHANGE UP
NRBC # BLD AUTO: 0 K/UL — SIGNIFICANT CHANGE UP (ref 0–0)
NRBC # FLD: 0 K/UL — SIGNIFICANT CHANGE UP (ref 0–0)
NRBC BLD AUTO-RTO: 0 /100 WBCS — SIGNIFICANT CHANGE UP (ref 0–0)
PHOSPHATE SERPL-MCNC: 5.5 MG/DL — HIGH (ref 2.5–4.5)
PLATELET # BLD AUTO: 166 K/UL — SIGNIFICANT CHANGE UP (ref 150–400)
PMV BLD: 10.8 FL — SIGNIFICANT CHANGE UP (ref 7–13)
POTASSIUM SERPL-MCNC: 4.3 MMOL/L — SIGNIFICANT CHANGE UP (ref 3.5–5.3)
POTASSIUM SERPL-SCNC: 4.3 MMOL/L — SIGNIFICANT CHANGE UP (ref 3.5–5.3)
PROT ?TM UR-MCNC: 111 MG/DL — HIGH (ref 0–12)
PROT SERPL-MCNC: 7.2 G/DL — SIGNIFICANT CHANGE UP (ref 6–8.3)
PROT/CREAT UR-RTO: 2.7 RATIO — HIGH (ref 0–0.2)
RBC # BLD: 3.51 M/UL — LOW (ref 4.2–5.8)
RBC # FLD: 15.5 % — HIGH (ref 10.3–14.5)
SODIUM SERPL-SCNC: 140 MMOL/L — SIGNIFICANT CHANGE UP (ref 135–145)
TRIGL SERPL-MCNC: 179 MG/DL — HIGH
WBC # BLD: 8.15 K/UL — SIGNIFICANT CHANGE UP (ref 3.8–10.5)
WBC # FLD AUTO: 8.15 K/UL — SIGNIFICANT CHANGE UP (ref 3.8–10.5)

## 2025-06-24 PROCEDURE — 84295 ASSAY OF SERUM SODIUM: CPT

## 2025-06-24 PROCEDURE — 82570 ASSAY OF URINE CREATININE: CPT

## 2025-06-24 PROCEDURE — 83880 ASSAY OF NATRIURETIC PEPTIDE: CPT

## 2025-06-24 PROCEDURE — 82803 BLOOD GASES ANY COMBINATION: CPT

## 2025-06-24 PROCEDURE — 83690 ASSAY OF LIPASE: CPT

## 2025-06-24 PROCEDURE — 82330 ASSAY OF CALCIUM: CPT

## 2025-06-24 PROCEDURE — 84156 ASSAY OF PROTEIN URINE: CPT

## 2025-06-24 PROCEDURE — 71046 X-RAY EXAM CHEST 2 VIEWS: CPT

## 2025-06-24 PROCEDURE — 85027 COMPLETE CBC AUTOMATED: CPT

## 2025-06-24 PROCEDURE — 80061 LIPID PANEL: CPT

## 2025-06-24 PROCEDURE — 99232 SBSQ HOSP IP/OBS MODERATE 35: CPT | Mod: GC

## 2025-06-24 PROCEDURE — 85018 HEMOGLOBIN: CPT

## 2025-06-24 PROCEDURE — 84100 ASSAY OF PHOSPHORUS: CPT

## 2025-06-24 PROCEDURE — 83735 ASSAY OF MAGNESIUM: CPT

## 2025-06-24 PROCEDURE — 93306 TTE W/DOPPLER COMPLETE: CPT | Mod: 26

## 2025-06-24 PROCEDURE — 76770 US EXAM ABDO BACK WALL COMP: CPT

## 2025-06-24 PROCEDURE — 84484 ASSAY OF TROPONIN QUANT: CPT

## 2025-06-24 PROCEDURE — 85014 HEMATOCRIT: CPT

## 2025-06-24 PROCEDURE — 93005 ELECTROCARDIOGRAM TRACING: CPT

## 2025-06-24 PROCEDURE — 84132 ASSAY OF SERUM POTASSIUM: CPT

## 2025-06-24 PROCEDURE — 84145 PROCALCITONIN (PCT): CPT

## 2025-06-24 PROCEDURE — 83605 ASSAY OF LACTIC ACID: CPT

## 2025-06-24 PROCEDURE — 81001 URINALYSIS AUTO W/SCOPE: CPT

## 2025-06-24 PROCEDURE — 82043 UR ALBUMIN QUANTITATIVE: CPT

## 2025-06-24 PROCEDURE — 80053 COMPREHEN METABOLIC PANEL: CPT

## 2025-06-24 PROCEDURE — 82435 ASSAY OF BLOOD CHLORIDE: CPT

## 2025-06-24 PROCEDURE — 85025 COMPLETE CBC W/AUTO DIFF WBC: CPT

## 2025-06-24 PROCEDURE — 82947 ASSAY GLUCOSE BLOOD QUANT: CPT

## 2025-06-24 PROCEDURE — 83036 HEMOGLOBIN GLYCOSYLATED A1C: CPT

## 2025-06-24 RX ORDER — TAMSULOSIN HYDROCHLORIDE 0.4 MG/1
0.4 CAPSULE ORAL AT BEDTIME
Refills: 0 | Status: DISCONTINUED | OUTPATIENT
Start: 2025-06-24 | End: 2025-06-25

## 2025-06-24 RX ORDER — AMMONIUM LACTATE 12 %
1 LOTION (ML) TOPICAL
Refills: 0 | Status: DISCONTINUED | OUTPATIENT
Start: 2025-06-24 | End: 2025-06-25

## 2025-06-24 RX ORDER — CARVEDILOL 3.12 MG/1
6.25 TABLET, FILM COATED ORAL EVERY 12 HOURS
Refills: 0 | Status: DISCONTINUED | OUTPATIENT
Start: 2025-06-24 | End: 2025-06-24

## 2025-06-24 RX ORDER — CARVEDILOL 3.12 MG/1
12.5 TABLET, FILM COATED ORAL EVERY 12 HOURS
Refills: 0 | Status: DISCONTINUED | OUTPATIENT
Start: 2025-06-24 | End: 2025-06-25

## 2025-06-24 RX ORDER — CALCIUM GLUCONATE 20 MG/ML
1 INJECTION, SOLUTION INTRAVENOUS ONCE
Refills: 0 | Status: COMPLETED | OUTPATIENT
Start: 2025-06-24 | End: 2025-06-24

## 2025-06-24 RX ADMIN — CALCIUM GLUCONATE 100 GRAM(S): 20 INJECTION, SOLUTION INTRAVENOUS at 09:07

## 2025-06-24 RX ADMIN — Medication 60 MILLILITER(S): at 22:52

## 2025-06-24 RX ADMIN — LISINOPRIL 100 MILLIGRAM(S): 30 TABLET ORAL at 06:14

## 2025-06-24 RX ADMIN — CARVEDILOL 6.25 MILLIGRAM(S): 3.12 TABLET, FILM COATED ORAL at 17:18

## 2025-06-24 RX ADMIN — Medication 60 MILLIGRAM(S): at 06:14

## 2025-06-24 RX ADMIN — LABETALOL HYDROCHLORIDE 5 MILLIGRAM(S): 200 TABLET, FILM COATED ORAL at 00:20

## 2025-06-24 RX ADMIN — Medication 60 MILLILITER(S): at 13:31

## 2025-06-24 RX ADMIN — TAMSULOSIN HYDROCHLORIDE 0.4 MILLIGRAM(S): 0.4 CAPSULE ORAL at 22:51

## 2025-06-24 RX ADMIN — NICOTINE POLACRILEX 1 PATCH: 4 GUM, CHEWING ORAL at 19:43

## 2025-06-24 RX ADMIN — Medication 60 MILLILITER(S): at 06:15

## 2025-06-24 RX ADMIN — NICOTINE POLACRILEX 1 PATCH: 4 GUM, CHEWING ORAL at 00:20

## 2025-06-24 RX ADMIN — Medication 60 MILLIGRAM(S): at 17:19

## 2025-06-24 NOTE — PROGRESS NOTE ADULT - PROBLEM SELECTOR PLAN 1
chronic l/l LE edema and erythema since 04/25 with acute worsening in the past few days.    Plan:  - Nephro consulted. Appreciate recs:     >Can hold off on diuretics since no SOB. Monitor renal function first.

## 2025-06-24 NOTE — PROGRESS NOTE ADULT - PROBLEM SELECTOR PLAN 6
- Subq hep 5000u q8h  - Diet: renal restrictions  - Dipso: Pending Dx of malignant HTN. Self d/romelia multiple BP control meds. Still takes Atenolol 100mg    Plan:   - STOP Atenolol 100mg QD -> START Carvedilol 6.25mg QD  - C/w Nifedipine ER 60mg q12h  - HOLD [id number]  - F/u Hba1c Dx of malignant HTN. Self d/romelia multiple BP control meds. Still takes Atenolol 100mg    Plan:   - STOP Atenolol 100mg QD -> START Carvedilol 6.25mg QD  - C/w Nifedipine ER 60mg q12h  - HOLD [id number]  - F/u Hba1c: 5.3  - F/u Lipid panel: Triglycerides to 179, LDL WNL - statins not indicated

## 2025-06-24 NOTE — DISCHARGE NOTE PROVIDER - NSDCFUADDAPPT_GEN_ALL_CORE_FT
APPTS ARE READY TO BE MADE: [x] YES    Best Family or Patient Contact (if needed):    Additional Information about above appointments (if needed):    1: Nephrology  2:   3:     Other comments or requests:    APPTS ARE READY TO BE MADE: [x] YES    Best Family or Patient Contact (if needed):    Additional Information about above appointments (if needed):    1: Nephrology  2: PCP  3:     Other comments or requests:    APPTS ARE READY TO BE MADE: [x] YES    Best Family or Patient Contact (if needed):    Additional Information about above appointments (if needed):    1: Nephrology  2: Cardiology  3: PCP    Other comments or requests:    APPTS ARE READY TO BE MADE: [x] YES    Best Family or Patient Contact (if needed):    Additional Information about above appointments (if needed):    1: Nephrology  2: Cardiology  3: PCP    Other comments or requests:     cardio - Prior to outreaching the patient, it was visible that the patient has secured a follow up appointment which was not scheduled by our team for 6/27/25 at 9:15am with Dr. Milton Guillen at [address] [place] [address].    pcp - Prior to outreaching the patient, it was visible that the patient has secured a follow up appointment which was not scheduled by our team for 6/30/25 at 11am with Dr. Wilmer Benjamin at [address] [place] [address].    nephro - Prior to outreaching the patient, it was visible that the patient has secured a follow up appointment which was not scheduled by our team for 7/7/25 at 12pm with Dr. Kiersten Matta [facility]

## 2025-06-24 NOTE — DISCHARGE NOTE PROVIDER - NSFOLLOWUPCLINICS_GEN_ALL_ED_FT
[facility]  Nephrology  [address] [place] [address], [address] [place] [address]  Phone: [phone]  Fax:   Follow Up Time: 1 week

## 2025-06-24 NOTE — DISCHARGE NOTE PROVIDER - HOSPITAL COURSE
RL is a 50 year old M with PMH of Stage IV CKD, malignant HTN, LV hypertrophy presents to [facility] referred by his cardiologist Dr. Guillen after pt's appointment today for acute on chronic worsening b/l lower extremity edema and erythema (L>R) and dyspnea on exertion. The LE swelling and erythema has been ongoing for 1 week but has acutely worsened in the past few days. He endorses tightness of b/l LE, dyspnea on exertion, sleeping sitting up (but due to shoulder surgery), and adequate urine production. He denies leg pain/parethesia, acute weight gain, chest pain, dizziness, n/v, and dysuria.    Of note he had self d/romelia most of his medications for a few months including Lasix. He has only been taking [id number], Atenolol 100mg and Nifedipine ER 60mg if BP >160/90. His home AM BP readings measure around 160/95.   Nephrologist (Dr. Juan LOPEZ) was last seen 1yr ago where his Cr was 4.1. He has a hx of L adrenal resection for HTN with renal bx (pathology results unknown by pt). BP improved after resection but pt remains on BP meds for BP control.     Patient presented to ED on 6/19/25 also for LE edema and erythema and dyspnea upon exertion. Obtained LLE Duplex US which showed no evidence of DVT.    In the ED, pt is afebrile, tachycardic (105-116), hypertensive (146/98), tachypneic (20), and satting @100% on RA. EKG showed sinus tachycardia. CXR showed clear lungs. Bladder and kidney US obtained. Pro-BNP elevated to 1200s. BUN: Cr was 95:7.12. VBG showed acidosis with pH of 7.17, bicarb 14, and AG to 20. UA showed proteinuria and glucosuria.     #Bilateral leg edema    - Diuretics held for evaluation of renal function     #Dyspnea on exertion    - Elevated pro-BNP to 1293. Lungs clear.    - TTE ordered which showed --    #Acute kidney injury superimposed on CKD    - Stage V CKD. Renal US showed small kidneys with hyperechogenic parenchyma consistent with medical renal disease as well as 1-2 cysts bilaterally. No hydronephrosis appreciated. Bladder US was within normal limits.     #Metabolic acidosis    - Was started on bicitra which improved acidosis.     #Hypertension    - Was placed on home meds (Atenolol 100mg QD, Nifedipine XL 60mg BID) but remained persistently hypertensive. on 6/23, required 1x dose of 5mg labetalol for BP of 162/119. Atenolol 100mg switched to Carvedilol 6.25mg.          RL is a 50 year old M with PMH of Stage IV CKD, malignant HTN, LV hypertrophy presents to [facility] referred by his cardiologist Dr. Guillen after pt's appointment today for acute on chronic worsening b/l lower extremity edema and erythema (L>R) and dyspnea on exertion. The LE swelling and erythema has been ongoing for 1 week but has acutely worsened in the past few days. He endorses tightness of b/l LE, dyspnea on exertion, sleeping sitting up (but due to shoulder surgery), and adequate urine production. He denies leg pain/parethesia, acute weight gain, chest pain, dizziness, n/v, and dysuria.    Of note he had self d/romelia most of his medications for a few months including Lasix. He has only been taking [id number], Atenolol 100mg and Nifedipine ER 60mg if BP >160/90. His home AM BP readings measure around 160/95.   Nephrologist (Dr. Juan LOPEZ) was last seen 1yr ago where his Cr was 4.1. He has a hx of L adrenal resection for HTN with renal bx (pathology results unknown by pt). BP improved after resection but pt remains on BP meds for BP control.     Patient presented to ED on 6/19/25 also for LE edema and erythema and dyspnea upon exertion. Obtained LLE Duplex US which showed no evidence of DVT.    In the ED, pt is afebrile, tachycardic (105-116), hypertensive (146/98), tachypneic (20), and satting @100% on RA. EKG showed sinus tachycardia. CXR showed clear lungs. Bladder and kidney US obtained. Pro-BNP elevated to 1200s. BUN: Cr was 95:7.12. VBG showed acidosis with pH of 7.17, bicarb 14, and AG to 20. UA showed proteinuria and glucosuria.     #Bilateral leg edema    - Diuretics held for evaluation of renal function     #Dyspnea on exertion    - Elevated pro-BNP to 1293. Lungs clear.    - TTE ordered which showed --    #Acute kidney injury superimposed on CKD    - Stage V CKD. Renal US showed small kidneys with hyperechogenic parenchyma consistent with medical renal disease as well as 1-2 cysts bilaterally. No hydronephrosis appreciated. Bladder US was within normal limits.     #Metabolic acidosis    - Was started on bicitra which improved acidosis.     #Hypertension    - Was placed on home meds (Atenolol 100mg QD, Nifedipine XL 60mg BID) but remained persistently hypertensive. on 6/23, required 1x dose of 5mg labetalol for BP of 162/119. Atenolol 100mg switched to Carvedilol 6.25mg.     Discharge Plan: The patient is being discharged on medications for his high blood pressure. He will need close follow-up with specialists, including a nephrologist. He should also follow up with his primary care doctor.  It is important for him to continue his home medications for hypertension. If he experiences any worsening of his symptoms, or develops new symptoms such as shortness of breath, abdominal swelling, fever, or other concerns, he should return to the hospital.    On day of discharge, patient is clinically stable with no new exam findings or acute symptoms compared to prior. The patient was seen by the attending physician on the date of discharge and deemed stable and acceptable for discharge. The patient's chronic medical conditions were treated accordingly per the patient's home medication regimen. The patient's medication reconciliation (with changes made to chronic medications), follow up appointments, discharge orders, instructions, and significant lab and diagnostic studies are as noted.     Discharge follow up action items:     1. Follow up with PCP in 1-2 weeks. Follow up with nephrologist in 1-2 weeks.  2. Follow up labs: ***  3. Medication changes: ***  4. On hold medications: ***  5. Incidental findings: ***  6. Diagnoses Stage V CKD, Hypertension    Patient's ordered code status: [ ] Full code  [ ] DNR  [ ] Hospice  Patient disposition: ***    Patient will be discharged to _____ with close follow up.           RL is a 50 year old M with PMH of Stage IV CKD, malignant HTN, LV hypertrophy presents to [facility] referred by his cardiologist Dr. Guillen after pt's appointment today for acute on chronic worsening b/l lower extremity edema and erythema (L>R) and dyspnea on exertion. The LE swelling and erythema has been ongoing for 1 week but has acutely worsened in the past few days. He endorses tightness of b/l LE, dyspnea on exertion, sleeping sitting up (but due to shoulder surgery), and adequate urine production. He denies leg pain/parethesia, acute weight gain, chest pain, dizziness, n/v, and dysuria.    Of note he had self d/romelia most of his medications for a few months including Lasix. He has only been taking [id number], Atenolol 100mg and Nifedipine ER 60mg if BP >160/90. His home AM BP readings measure around 160/95.   Nephrologist (Dr. Juan LOPEZ) was last seen 1yr ago where his Cr was 4.1. He has a hx of L adrenal resection for HTN with renal bx (pathology results unknown by pt). BP improved after resection but pt remains on BP meds for BP control.     Patient presented to ED on 6/19/25 also for LE edema and erythema and dyspnea upon exertion. Obtained LLE Duplex US which showed no evidence of DVT.    In the ED, pt is afebrile, tachycardic (105-116), hypertensive (146/98), tachypneic (20), and satting @100% on RA. EKG showed sinus tachycardia. CXR showed clear lungs. Bladder and kidney US obtained. Pro-BNP elevated to 1200s. BUN: Cr was 95:7.12. VBG showed acidosis with pH of 7.17, bicarb 14, and AG to 20. UA showed proteinuria and glucosuria.     #Bilateral leg edema    - Diuretics held for evaluation of renal function     #Dyspnea on exertion    - Elevated pro-BNP to 1293. Lungs clear.    - TTE ordered which showed --    #Acute kidney injury superimposed on CKD    - Stage V CKD. Renal US showed small kidneys with hyperechogenic parenchyma consistent with medical renal disease as well as 1-2 cysts bilaterally. No hydronephrosis appreciated. Bladder US was within normal limits.     #Metabolic acidosis    - Was started on bicitra which improved acidosis.     #Hypertension    - Was placed on home meds (Atenolol 100mg QD, Nifedipine XL 60mg BID) but remained persistently hypertensive. on 6/23, required 1x dose of 5mg labetalol for BP of 162/119. Atenolol 100mg switched to Carvedilol 6.25mg.     Discharge Plan: The patient is being discharged on medications for his high blood pressure. He will need close follow-up with specialists, including a nephrologist and cardiologist. He should also follow up with his primary care doctor.  It is important for him to continue his home medications for hypertension. If he experiences any worsening of his symptoms, or develops new symptoms such as shortness of breath, abdominal swelling, fever, or other concerns, he should return to the hospital.    On day of discharge, patient is clinically stable with no new exam findings or acute symptoms compared to prior. The patient was seen by the attending physician on the date of discharge and deemed stable and acceptable for discharge. The patient's chronic medical conditions were treated accordingly per the patient's home medication regimen. The patient's medication reconciliation (with changes made to chronic medications), follow up appointments, discharge orders, instructions, and significant lab and diagnostic studies are as noted.     Discharge follow up action items:     1. Follow up with PCP in 1-2 weeks. Follow up with nephrologist in 1-2 weeks.  2. Follow up labs: ***  3. Medication changes: ***  4. On hold medications: ***  5. Incidental findings: ***  6. Diagnoses Stage V CKD, Hypertension    Patient's ordered code status: [ ] Full code  [ ] DNR  [ ] Hospice  Patient disposition: ***    Patient will be discharged to _____ with close follow up.           RL is a 50 year old M with PMH of Stage IV CKD, malignant HTN, LV hypertrophy presents to [facility] referred by his cardiologist Dr. Guillen after pt's appointment today for acute on chronic worsening b/l lower extremity edema and erythema (L>R) and dyspnea on exertion. The LE swelling and erythema has been ongoing for 1 week but has acutely worsened in the past few days. He endorses tightness of b/l LE, dyspnea on exertion, sleeping sitting up (but due to shoulder surgery), and adequate urine production. He denies leg pain/parethesia, acute weight gain, chest pain, dizziness, n/v, and dysuria.    Of note he had self d/romelia most of his medications for a few months including Lasix. He has only been taking [id number], Atenolol 100mg and Nifedipine ER 60mg if BP >160/90. His home AM BP readings measure around 160/95.   Nephrologist (Dr. Juan LOPEZ) was last seen 1yr ago where his Cr was 4.1. He has a hx of L adrenal resection for HTN with renal bx (pathology results unknown by pt). BP improved after resection but pt remains on BP meds for BP control.     Patient presented to ED on 6/19/25 also for LE edema and erythema and dyspnea upon exertion. Obtained LLE Duplex US which showed no evidence of DVT.    In the ED, pt is afebrile, tachycardic (105-116), hypertensive (146/98), tachypneic (20), and satting @100% on RA. EKG showed sinus tachycardia. CXR showed clear lungs. Bladder and kidney US obtained. Pro-BNP elevated to 1200s. BUN: Cr was 95:7.12. VBG showed acidosis with pH of 7.17, bicarb 14, and AG to 20. UA showed proteinuria and glucosuria.     #Bilateral leg edema    - Diuretics held for evaluation of renal function     #Dyspnea on exertion    - Elevated pro-BNP to 1293. Lungs clear.    - TTE ordered which showed normal EF    #Acute kidney injury superimposed on CKD    - Stage V CKD. Renal US showed small kidneys with hyperechogenic parenchyma consistent with medical renal disease as well as 1-2 cysts bilaterally. No hydronephrosis appreciated. Bladder US was within normal limits.     #Metabolic acidosis    - Was started on bicitra which improved acidosis.     #Hypertension    - Was placed on home meds (Atenolol 100mg QD, Nifedipine XL 60mg BID) but remained persistently hypertensive. on 6/23, required 1x dose of 5mg labetalol for BP of 162/119. Atenolol 100mg switched to Carvedilol 12.5mg.     Discharge Plan: The patient is being discharged on medications for his high blood pressure. He will need close follow-up with specialists, including a nephrologist and cardiologist. He should also follow up with his primary care doctor.  It is important for him to continue his home medications for hypertension. If he experiences any worsening of his symptoms, or develops new symptoms such as shortness of breath, abdominal swelling, fever, or other concerns, he should return to the hospital.    On day of discharge, patient is clinically stable with no new exam findings or acute symptoms compared to prior. The patient was seen by the attending physician on the date of discharge and deemed stable and acceptable for discharge. The patient's chronic medical conditions were treated accordingly per the patient's home medication regimen. The patient's medication reconciliation (with changes made to chronic medications), follow up appointments, discharge orders, instructions, and significant lab and diagnostic studies are as noted.     Discharge follow up action items:     1. Follow up with PCP in 1-2 weeks. Follow up with nephrologist in 1-2 weeks.  2. Follow up labs: BMP in 5 days  3. Medication changes: Bicitra 60 TID, Coreg 12.5 BID  4. On hold medications: STOP atenolol  5. Incidental findings:   6. Diagnoses Stage V CKD, Hypertension    Patient's ordered code status: [x] Full code  [ ] DNR  [ ] Hospice  Patient disposition: home    Patient will be discharged to _____ with close follow up.           RL is a 50 year old M with PMH of Stage IV CKD, malignant HTN, LV hypertrophy presents to [facility] referred by his cardiologist Dr. Guillen after pt's appointment today for acute on chronic worsening b/l lower extremity edema and erythema (L>R) and dyspnea on exertion. The LE swelling and erythema has been ongoing for 1 week but has acutely worsened in the past few days. He endorses tightness of b/l LE, dyspnea on exertion, sleeping sitting up (but due to shoulder surgery), and adequate urine production. He denies leg pain/parethesia, acute weight gain, chest pain, dizziness, n/v, and dysuria.    Of note he had self d/romelia most of his medications for a few months including Lasix. He has only been taking [id number], Atenolol 100mg and Nifedipine ER 60mg if BP >160/90. His home AM BP readings measure around 160/95.   Nephrologist (Dr. Juan LOPEZ) was last seen 1yr ago where his Cr was 4.1. He has a hx of L adrenal resection for HTN with renal bx (pathology results unknown by pt). BP improved after resection but pt remains on BP meds for BP control.     Patient presented to ED on 6/19/25 also for LE edema and erythema and dyspnea upon exertion. Obtained LLE Duplex US which showed no evidence of DVT.    In the ED, pt is afebrile, tachycardic (105-116), hypertensive (146/98), tachypneic (20), and satting @100% on RA. EKG showed sinus tachycardia. CXR showed clear lungs. Bladder and kidney US obtained. Pro-BNP elevated to 1200s. BUN: Cr was 95:7.12. VBG showed acidosis with pH of 7.17, bicarb 14, and AG to 20. UA showed proteinuria and glucosuria.     Hospital Course:  Patient admitted for LLE swelling and metabolic acidosis. Given his recent Duplex (6/19) negative for DVT; no further imaging obtained this admission. Nephrology consulted and recommended to check urine study, US kidney and bladder, and to hold off on diuretics. Renal US showed small kidneys with hyperechogenic parenchyma consistent with medical renal disease as well as 1-2 cysts bilaterally. No hydronephrosis appreciated. Bladder US was within normal limits. He was also started on bicitra which improved acidosis. Patient underwent TTE, which showed normal EF of 70%, mitral valve stenosis; but otherwise unremarkable. Course was complicated by hypertensive episodes, for which his home Atenolol was switched to Coreg 12.5 BID.    Discharge Plan: The patient is being discharged on medications for his high blood pressure. He will need close follow-up with specialists, including a nephrologist and cardiologist. He should also follow up with his primary care doctor.  It is important for him to continue his home medications for hypertension. If he experiences any worsening of his symptoms, or develops new symptoms such as shortness of breath, abdominal swelling, fever, or other concerns, he should return to the hospital.    On day of discharge, patient is clinically stable with no new exam findings or acute symptoms compared to prior. The patient was seen by the attending physician on the date of discharge and deemed stable and acceptable for discharge. The patient's chronic medical conditions were treated accordingly per the patient's home medication regimen. The patient's medication reconciliation (with changes made to chronic medications), follow up appointments, discharge orders, instructions, and significant lab and diagnostic studies are as noted.     Discharge follow up action items:     1. Follow up with PCP in 1-2 weeks. Follow up with nephrologist in 1-2 weeks.  2. Follow up labs: BMP in 5 days  3. Medication changes: *****Bicitra 60 TID, Coreg 12.5 BID  4. On hold medications: STOP atenolol  5. Incidental findings:   6. Diagnoses Stage V CKD, Hypertension    Patient's ordered code status: [x] Full code  [ ] DNR  [ ] Hospice  Patient disposition: home    Patient will be discharged to _____ with close follow up.           RL is a 50 year old M with PMH of Stage IV CKD, malignant HTN, LV hypertrophy presents to [facility] referred by his cardiologist Dr. Guillen after pt's appointment today for acute on chronic worsening b/l lower extremity edema and erythema (L>R) and dyspnea on exertion. The LE swelling and erythema has been ongoing for 1 week but has acutely worsened in the past few days. He endorses tightness of b/l LE, dyspnea on exertion, sleeping sitting up (but due to shoulder surgery), and adequate urine production. He denies leg pain/paresthesia, acute weight gain, chest pain, dizziness, n/v, and dysuria.    Of note he had self d/romelia most of his medications for a few months including Lasix. He has only been taking [id number], Atenolol 100mg and Nifedipine ER 60mg if BP >160/90. His home AM BP readings measure around 160/95.   Nephrologist (Dr. Juan LOPEZ) was last seen 1yr ago where his Cr was 4.1. He has a hx of L adrenal resection for HTN with renal bx (pathology results unknown by pt). BP improved after resection but pt remains on BP meds for BP control.     Patient presented to ED on 6/19/25 also for LE edema and erythema and dyspnea upon exertion. Obtained LLE Duplex US which showed no evidence of DVT.    In the ED, pt is afebrile, tachycardic (105-116), hypertensive (146/98), tachypneic (20), and satting @100% on RA. EKG showed sinus tachycardia. CXR showed clear lungs. Bladder and kidney US obtained. Pro-BNP elevated to 1200s. BUN: Cr was 95:7.12. VBG showed acidosis with pH of 7.17, bicarb 14, and AG to 20. UA showed proteinuria and glucosuria.     Hospital Course:  Patient admitted for LLE swelling and metabolic acidosis. Given his recent Duplex (6/19) negative for DVT; no further imaging obtained this admission. Nephrology consulted and recommended to check urine study, US kidney and bladder, and to hold off on diuretics. Renal US showed small kidneys with hyperechogenic parenchyma consistent with medical renal disease as well as 1-2 cysts bilaterally. No hydronephrosis appreciated. Bladder US was within normal limits. He was also started on bicitra which improved acidosis. Patient underwent TTE, which showed normal EF of 70%, mitral valve stenosis; but otherwise unremarkable. Course was complicated by hypertensive episodes, for which his home Atenolol was switched to Coreg 12.5 BID.    Discharge Plan: The patient is being discharged on medications for his high blood pressure. He will need close follow-up with specialists, including a nephrologist and cardiologist. He should also follow up with his primary care doctor.  It is important for him to continue his home medications for hypertension. If he experiences any worsening of his symptoms, or develops new symptoms such as shortness of breath, abdominal swelling, fever, or other concerns, he should return to the hospital.    On day of discharge, patient is clinically stable with no new exam findings or acute symptoms compared to prior. The patient was seen by the attending physician on the date of discharge and deemed stable and acceptable for discharge. The patient's chronic medical conditions were treated accordingly per the patient's home medication regimen. The patient's medication reconciliation (with changes made to chronic medications), follow up appointments, discharge orders, instructions, and significant lab and diagnostic studies are as noted.     Discharge follow up action items:     1. Follow up with PCP in 1-2 weeks. Follow up with nephrologist in 1-2 weeks.  2. Follow up labs: BMP in 5 days  3. Medication changes: ***Bicitra 60mL TID, Coreg 12.5mg BID  4. On hold medications: STOP atenolol  5. Incidental findings: bilateral kidney cysts (1-2 on each kidney)  6. Diagnoses: Stage V CKD, Hypertension    Patient's ordered code status: [x] Full code  [ ] DNR  [ ] Hospice  Patient disposition: home    Patient will be discharged to home with close follow up           RL is a 50 year old M with PMH of Stage IV CKD, malignant HTN, LV hypertrophy presents to [facility] referred by his cardiologist Dr. Guillen after pt's appointment today for acute on chronic worsening b/l lower extremity edema and erythema (L>R) and dyspnea on exertion. The LE swelling and erythema has been ongoing for 1 week but has acutely worsened in the past few days. He endorses tightness of b/l LE, dyspnea on exertion, sleeping sitting up (but due to shoulder surgery), and adequate urine production. He denies leg pain/paresthesia, acute weight gain, chest pain, dizziness, n/v, and dysuria.    Of note he had self d/romelia most of his medications for a few months including Lasix. He has only been taking [id number], Atenolol 100mg and Nifedipine ER 60mg if BP >160/90. His home AM BP readings measure around 160/95.   Nephrologist (Dr. Juan LOPEZ) was last seen 1yr ago where his Cr was 4.1. He has a hx of L adrenal resection for HTN with renal bx (pathology results unknown by pt). BP improved after resection but pt remains on BP meds for BP control.     Patient presented to ED on 6/19/25 also for LE edema and erythema and dyspnea upon exertion. Obtained LLE Duplex US which showed no evidence of DVT.    In the ED, pt is afebrile, tachycardic (105-116), hypertensive (146/98), tachypneic (20), and satting @100% on RA. EKG showed sinus tachycardia. CXR showed clear lungs. Bladder and kidney US obtained. Pro-BNP elevated to 1200s. BUN: Cr was 95:7.12. VBG showed acidosis with pH of 7.17, bicarb 14, and AG to 20. UA showed proteinuria and glucosuria.     Hospital Course:  Patient admitted for LLE swelling and metabolic acidosis. Given his recent Duplex (6/19) negative for DVT; no further imaging obtained this admission. Nephrology consulted and recommended to check urine study, US kidney and bladder, and to hold off on diuretics. Renal US showed small kidneys with hyperechogenic parenchyma consistent with medical renal disease as well as 1-2 cysts bilaterally. No hydronephrosis appreciated. Bladder US was within normal limits. He was also started on bicitra which improved acidosis. Patient underwent TTE, which showed normal EF of 70%, mitral valve stenosis; but otherwise unremarkable. Course was complicated by hypertensive episodes, for which his home Atenolol was switched to Coreg 12.5 BID.    Discharge Plan: The patient is being discharged on medications for his high blood pressure. He will need close follow-up with specialists, including a nephrologist and cardiologist. He should also follow up with his primary care doctor.  It is important for him to continue his home medications for hypertension. If he experiences any worsening of his symptoms, or develops new symptoms such as shortness of breath, abdominal swelling, fever, or other concerns, he should return to the hospital.    On day of discharge, patient is clinically stable with no new exam findings or acute symptoms compared to prior. The patient was seen by the attending physician on the date of discharge and deemed stable and acceptable for discharge. The patient's chronic medical conditions were treated accordingly per the patient's home medication regimen. The patient's medication reconciliation (with changes made to chronic medications), follow up appointments, discharge orders, instructions, and significant lab and diagnostic studies are as noted.     Discharge follow up action items:     1. Follow up with PCP in 1-2 weeks. Follow up with nephrologist in 1-2 weeks.  2. Follow up labs: BMP in 5 days  3. Medication changes: Bicitra 60mL TID, Coreg 12.5mg BID  4. On hold medications: STOP atenolol  5. Incidental findings: bilateral kidney cysts (1-2 on each kidney)  6. Diagnoses: Stage V CKD, Hypertension    Patient's ordered code status: [x] Full code  [ ] DNR  [ ] Hospice  Patient disposition: home    Patient will be discharged to home with close follow up           RL is a 50 year old M with PMH of Stage IV CKD, malignant HTN, LV hypertrophy presents to [facility] referred by his cardiologist Dr. Guillen after pt's appointment today for acute on chronic worsening b/l lower extremity edema and erythema (L>R) and dyspnea on exertion. The LE swelling and erythema has been ongoing for 1 week but has acutely worsened in the past few days. He endorses tightness of b/l LE, dyspnea on exertion, sleeping sitting up (but due to shoulder surgery), and adequate urine production. He denies leg pain/paresthesia, acute weight gain, chest pain, dizziness, n/v, and dysuria.    Of note he had self d/romelia most of his medications for a few months including Lasix. He has only been taking [id number], Atenolol 100mg and Nifedipine ER 60mg if BP >160/90. His home AM BP readings measure around 160/95.   Nephrologist (Dr. Juan LOPEZ) was last seen 1yr ago where his Cr was 4.1. He has a hx of L adrenal resection for HTN with renal bx (pathology results unknown by pt). BP improved after resection but pt remains on BP meds for BP control.     Patient presented to ED on 6/19/25 also for LE edema and erythema and dyspnea upon exertion. Obtained LLE Duplex US which showed no evidence of DVT.    In the ED, pt is afebrile, tachycardic (105-116), hypertensive (146/98), tachypneic (20), and satting @100% on RA. EKG showed sinus tachycardia. CXR showed clear lungs. Bladder and kidney US obtained. Pro-BNP elevated to 1200s. BUN: Cr was 95:7.12. VBG showed acidosis with pH of 7.17, bicarb 14, and AG to 20. UA showed proteinuria and glucosuria.     Hospital Course:  Patient admitted for LLE swelling and metabolic acidosis. Given his recent Duplex (6/19) negative for DVT; no further imaging obtained this admission. Nephrology consulted and recommended to check urine study, US kidney and bladder, and to hold off on diuretics. Renal US showed small kidneys with hyperechogenic parenchyma consistent with medical renal disease as well as 1-2 cysts bilaterally. No hydronephrosis appreciated. Bladder US was within normal limits. He was also started on bicitra which improved acidosis. Patient underwent TTE, which showed normal EF of 70%, mitral valve stenosis; but otherwise unremarkable. Course was complicated by hypertensive episodes, for which his home Atenolol was switched to Coreg 12.5 BID.    Discharge Plan: The patient is being discharged on medications for his high blood pressure. He will need close follow-up with specialists, including a nephrologist and cardiologist. He should also follow up with his primary care doctor.  It is important for him to continue his home medications for hypertension. If he experiences any worsening of his symptoms, or develops new symptoms such as shortness of breath, abdominal swelling, fever, or other concerns, he should return to the hospital.    On day of discharge, patient is clinically stable with no new exam findings or acute symptoms compared to prior. The patient was seen by the attending physician on the date of discharge and deemed stable and acceptable for discharge. The patient's chronic medical conditions were treated accordingly per the patient's home medication regimen. The patient's medication reconciliation (with changes made to chronic medications), follow up appointments, discharge orders, instructions, and significant lab and diagnostic studies are as noted.     Discharge follow up action items:     1. Follow up with PCP in 1-2 weeks. Follow up with nephrologist in 1-2 weeks.  2. Follow up labs: BMP in 5 days  3. Medication changes: Bicitra 60mL TID, Coreg 12.5mg BID, tamsulosin 0.4mg daily  4. On hold medications: STOP atenolol  5. Incidental findings: bilateral kidney cysts (1-2 on each kidney)  6. Diagnoses: Stage V CKD, Hypertension    Patient's ordered code status: [x] Full code  [ ] DNR  [ ] Hospice  Patient disposition: home    Patient will be discharged to home with close follow up

## 2025-06-24 NOTE — DISCHARGE NOTE PROVIDER - NSDCCPTREATMENT_GEN_ALL_CORE_FT
PRINCIPAL PROCEDURE  Procedure: Ultrasound, kidney and bladder  Findings and Treatment: You received an ultrasound of the kidneys and bladder while in the hospital. It showed that there are no issues with your bladder. Your kidneys were found to be small, had 1-2 cysts on both kidneys, and had signs that are consistent with your known diagnosis of chronic kidney disease.     PRINCIPAL PROCEDURE  Procedure: Ultrasound, kidney and bladder  Findings and Treatment: You received an ultrasound of the kidneys and bladder while in the hospital. It showed that there are no issues with your bladder. Your kidneys were found to be small, had 1-2 cysts on both kidneys, and had signs that are consistent with your known diagnosis of chronic kidney disease.      SECONDARY PROCEDURE  Procedure: Complete transthoracic echocardiography  Findings and Treatment: You received an echocardiogram of your heart. It showed that you have normal ejection of blood from the heart. An abnormality that was noted was dilation of your aorta.     PRINCIPAL PROCEDURE  Procedure: Ultrasound, kidney and bladder  Findings and Treatment: You received an ultrasound of the kidneys and bladder while in the hospital. It showed that there are no issues with your bladder. Your kidneys were found to be small, had 1-2 cysts on both kidneys, and had signs that are consistent with your known diagnosis of chronic kidney disease. Please follow up with your nephrologist.      SECONDARY PROCEDURE  Procedure: Complete transthoracic echocardiography  Findings and Treatment: You received an echocardiogram of your heart. It showed that you have normal ejection of blood from the heart. An abnormality that was noted was dilation of your aorta. Please follow up with your cardiologist.

## 2025-06-24 NOTE — DISCHARGE NOTE PROVIDER - ATTENDING DISCHARGE PHYSICAL EXAMINATION:
CONSTITUTIONAL: NAD, well-developed  EYES: PERRLA; conjunctiva and sclera clear  ENMT: Moist oral mucosa, no pharyngeal injection or exudates  NECK: Supple  RESPIRATORY: Normal respiratory effort; lungs are clear to auscultation bilaterally  CARDIOVASCULAR: Regular rate and rhythm, normal S1 and S2  ABDOMEN: Nontender to palpation, normoactive bowel sounds   MUSCULOSKELETAL: no clubbing or cyanosis of digits; mild swelling in LLE; no tenderness to palpation  PSYCH: A+O to person, place, and time; affect appropriate  NEUROLOGY: no gross sensory deficits   SKIN: No rashes

## 2025-06-24 NOTE — DISCHARGE NOTE PROVIDER - NSDCMRMEDTOKEN_GEN_ALL_CORE_FT
aspirin 81 mg oral tablet: 1 tab(s) orally once a day  atenolol 100 mg oral tablet: 1 tab(s) orally once a day  NIFEdipine ER 60 MG Oral Tablet Extended Release 24 Hour: 1 tab(s) orally 2 times a day   aspirin 81 mg oral tablet: 1 tab(s) orally once a day  carvedilol 12.5 mg oral tablet: 1 tab(s) orally every 12 hours  NIFEdipine ER 60 MG Oral Tablet Extended Release 24 Hour: 1 tab(s) orally 2 times a day  sodium citrate-citric acid 500 mg-334 mg/5 mL oral solution: 60 milliliter(s) orally 3 times a day  tamsulosin 0.4 mg oral capsule: 1 cap(s) orally once a day (at bedtime)

## 2025-06-24 NOTE — DISCHARGE NOTE PROVIDER - NSDCFUSCHEDAPPT_GEN_ALL_CORE_FT
[facility] Physician [facility]  INTMED [address]  Scheduled Appointment: 09/16/2025    Cb Luis  [facility] Physician [facility]  GASTRO [address]  Scheduled Appointment: 09/19/2025     Kiersten Matta  [facility]  NEPHRO [address]  Scheduled Appointment: 07/07/2025    Ramone Frederick  [facility]  UROLOGY [address]  Scheduled Appointment: 07/16/2025    [facility]  INTMED [address]  Scheduled Appointment: 09/16/2025    Cb Luis  [facility]  GASTRO [address]  Scheduled Appointment: 09/19/2025     Kiersten Matta  [facility]  NEPHRO [address]  Scheduled Appointment: 07/07/2025    Ramone Frederick  [facility]  UROLOGY [address]  Scheduled Appointment: 07/16/2025    [facility]  CARDIOLOGY [address]   Scheduled Appointment: 07/28/2025    [facility]  INTMED [address]  Scheduled Appointment: 09/16/2025    Cb Luis  [facility]  GASTRO [address]  Scheduled Appointment: 09/19/2025

## 2025-06-24 NOTE — PROGRESS NOTE ADULT - SUBJECTIVE AND OBJECTIVE BOX
PROGRESS NOTE:     Patient is a 50y old  Male who presents with a chief complaint of LE swelling and erythema (23 Jun 2025 15:09)      SUBJECTIVE / OVERNIGHT EVENTS:    OVERNIGHT: No acute overnight events.      Patient was examined at bedside and feels well this morning. Denies fever, chills, chest pain, SOB, nausea, vomiting. ROS otherwise negative and pt is amenable to current treatment plan.      REVIEW OF SYSTEMS:    CONSTITUTIONAL:  No weakness, fevers, or chills  EYES/ENT:  No visual changes, vertigo, or throat pain   NECK:  No pain or stiffness  RESPIRATORY:  No SOB, cough, wheezing, or hemoptysis  CARDIOVASCULAR:  No chest pain or palpitations  GASTROINTESTINAL:  No abdominal pain, nausea, vomiting, or hematemesis; no diarrhea, constipation, melena, or hematochezia  GENITOURINARY:  No dysuria, polyuria, or hematuria  NEUROLOGICAL:  No numbness or weakness  SKIN:  No itching or rashes      MEDICATIONS  (STANDING):  atenolol  Tablet 100 milliGRAM(s) Oral daily  citric acid/sodium citrate Solution 60 milliLiter(s) Oral three times a day  heparin   Injectable 5000 Unit(s) SubCutaneous every 8 hours  NIFEdipine XL 60 milliGRAM(s) Oral two times a day    MEDICATIONS  (PRN):  acetaminophen     Tablet .. 650 milliGRAM(s) Oral every 6 hours PRN Temp greater or equal to 38C (100.4F), Mild Pain (1 - 3)      CAPILLARY BLOOD GLUCOSE        I&O's Summary      PHYSICAL EXAM:  Vital Signs Last 24 Hrs  T(C): 36.7 (24 Jun 2025 05:20), Max: 36.8 (23 Jun 2025 19:45)  T(F): 98 (24 Jun 2025 05:20), Max: 98.2 (23 Jun 2025 19:45)  HR: 107 (24 Jun 2025 05:20) (92 - 116)  BP: 144/100 (24 Jun 2025 05:20) (144/100 - 169/126)  BP(mean): --  RR: 18 (24 Jun 2025 05:20) (18 - 20)  SpO2: 98% (24 Jun 2025 05:20) (98% - 100%)    Parameters below as of 24 Jun 2025 05:20  Patient On (Oxygen Delivery Method): room air        CONSTITUTIONAL: NAD; well-developed  HEENT: PERRL, clear conjunctiva  RESPIRATORY: Normal respiratory effort; lungs are clear to auscultation bilaterally; No crackles/rhonchi/wheezing  CARDIOVASCULAR: Regular rate and rhythm, normal S1 and S2, no murmur/rub/gallop; No lower extremity edema; Peripheral pulses are 2+ bilaterally  ABDOMEN: Nontender to palpation, normoactive bowel sounds, no rebound/guarding; No hepatosplenomegaly  MUSCULOSKELETAL: No clubbing or cyanosis of digits; no joint swelling or tenderness to palpation  EXTREMITY: Lower extremities non-tender to palpation; non-erythematous B/L  NEURO: A&Ox3; no focal deficits   PSYCH: Normal mood; affect appropriate    LABS:                        10.0   8.15  )-----------( 166      ( 24 Jun 2025 06:32 )             30.9     06-24    140  |  107  |  87[H]  ----------------------------<  96  4.3   |  15[L]  |  7.15[H]    Ca    7.3[L]      24 Jun 2025 06:32  Phos  5.5     06-24  Mg     1.9     06-24    TPro  7.2  /  Alb  4.0  /  TBili  0.2  /  DBili  x   /  AST  14  /  ALT  16  /  AlkPhos  164[H]  06-24          Urinalysis Basic - ( 24 Jun 2025 06:32 )    Color: x / Appearance: x / SG: x / pH: x  Gluc: 96 mg/dL / Ketone: x  / Bili: x / Urobili: x   Blood: x / Protein: x / Nitrite: x   Leuk Esterase: x / RBC: x / WBC x   Sq Epi: x / Non Sq Epi: x / Bacteria: x          RADIOLOGY & ADDITIONAL TESTS:    N/A PROGRESS NOTE:     Patient is a 50y old  Male who presents with a chief complaint of LE swelling and erythema (23 Jun 2025 15:09)      SUBJECTIVE / OVERNIGHT EVENTS:    OVERNIGHT:   - s/p 1x 5mg Labetalol at 23:00 for HTN to 162/119. Repeat BP was 156/95  - endorsed headache which pt attributed to nicotine withdrawal. s/p nicotine patch and tylenol which resolved headache    Patient was examined at bedside and feels well this morning. Endorsed minor chest tightness but no chest pain. Denies fever, chills, chest pain, SOB, nausea, vomiting. ROS otherwise negative and pt is amenable to current treatment plan. Improved b/l LE erythema today.      REVIEW OF SYSTEMS:    CONSTITUTIONAL:  No weakness, fevers, or chills  EYES/ENT:  No visual changes, vertigo, or throat pain   NECK:  No pain or stiffness  RESPIRATORY:  No SOB, cough, wheezing, or hemoptysis  CARDIOVASCULAR:  No chest pain or palpitations  GASTROINTESTINAL:  No abdominal pain, nausea, vomiting, or hematemesis; no diarrhea, constipation, melena, or hematochezia  GENITOURINARY:  No dysuria, polyuria, or hematuria  NEUROLOGICAL:  No numbness or weakness  SKIN:  No itching or rashes      MEDICATIONS  (STANDING):  atenolol  Tablet 100 milliGRAM(s) Oral daily  citric acid/sodium citrate Solution 60 milliLiter(s) Oral three times a day  heparin   Injectable 5000 Unit(s) SubCutaneous every 8 hours  NIFEdipine XL 60 milliGRAM(s) Oral two times a day    MEDICATIONS  (PRN):  acetaminophen     Tablet .. 650 milliGRAM(s) Oral every 6 hours PRN Temp greater or equal to 38C (100.4F), Mild Pain (1 - 3)      CAPILLARY BLOOD GLUCOSE        I&O's Summary      PHYSICAL EXAM:  Vital Signs Last 24 Hrs  T(C): 36.7 (24 Jun 2025 05:20), Max: 36.8 (23 Jun 2025 19:45)  T(F): 98 (24 Jun 2025 05:20), Max: 98.2 (23 Jun 2025 19:45)  HR: 107 (24 Jun 2025 05:20) (92 - 116)  BP: 144/100 (24 Jun 2025 05:20) (144/100 - 169/126)  BP(mean): --  RR: 18 (24 Jun 2025 05:20) (18 - 20)  SpO2: 98% (24 Jun 2025 05:20) (98% - 100%)    Parameters below as of 24 Jun 2025 05:20  Patient On (Oxygen Delivery Method): room air        CONSTITUTIONAL: Well groomed, no apparent distress  EYES: PERRLA and symmetric, EOMI, No conjunctival or scleral injection, non-icteric  ENMT: Oral mucosa with moist membranes.              NECK: Supple, symmetric and without tracheal deviation   RESP: No respiratory distress, no use of accessory muscles; CTA b/l but decreased breath sounds (likely due to body habitus)  CV: RRR, +S1S2, no JVD; + peripheral edema (b/l LE edema)  GI: Soft, NT, ND, no rebound, no guarding;   SKIN: B/l erythema of LE   NEURO: CN II-XII intact;   PSYCH: Appropriate insight/judgment; A+O x 3, mood and affect appropriate, recent/remote memory intact    LABS:                        10.0   8.15  )-----------( 166      ( 24 Jun 2025 06:32 )             30.9     06-24    140  |  107  |  87[H]  ----------------------------<  96  4.3   |  15[L]  |  7.15[H]    Ca    7.3[L]      24 Jun 2025 06:32  Phos  5.5     06-24  Mg     1.9     06-24    TPro  7.2  /  Alb  4.0  /  TBili  0.2  /  DBili  x   /  AST  14  /  ALT  16  /  AlkPhos  164[H]  06-24          Urinalysis Basic - ( 24 Jun 2025 06:32 )    Color: x / Appearance: x / SG: x / pH: x  Gluc: 96 mg/dL / Ketone: x  / Bili: x / Urobili: x   Blood: x / Protein: x / Nitrite: x   Leuk Esterase: x / RBC: x / WBC x   Sq Epi: x / Non Sq Epi: x / Bacteria: x          RADIOLOGY & ADDITIONAL TESTS:    N/A

## 2025-06-24 NOTE — PROGRESS NOTE ADULT - ASSESSMENT
RL is a 50 year old M with PMH of Stage 4 CKD, malignant HTN, LV hypertrophy presents to [facility] referred by his cardiologist Dr. Guillen after pt's appointment today for acute on chronic worsening b/l lower extremity edema and erythema (L>R) and dyspnea on exertion. Labs showed elevated proBNP, AG metabolic acidosis, ROMAN on CKD, normal WBC. Presentation is concerning for CHF vs. cellulitis with ROMAN on CKD.  RL is a 50 year old M with PMH of Stage 4 CKD (now stage V on this admission), malignant HTN, LV hypertrophy presents to [facility] referred by his cardiologist Dr. Guillen after pt's appointment today for acute on chronic worsening b/l lower extremity edema and erythema (L>R) and dyspnea on exertion. Labs showed elevated proBNP, AG metabolic acidosis, BUN:Cr of 95:1.12 representative of ROMAN on CKD, normal WBC. Presentation is concerning for CHF vs. less likely cellulitis compounded with ROMAN on CKD.

## 2025-06-24 NOTE — PROGRESS NOTE ADULT - SUBJECTIVE AND OBJECTIVE BOX
[facility] DIVISION OF KIDNEY DISEASES AND HYPERTENSION   FOLLOW UP NOTE    --------------------------------------------------------------------------------  Chief Complaint:    24 hour events/subjective: Pt. was seen and examined today. Feels okay      PAST HISTORY  --------------------------------------------------------------------------------  No significant changes to PMH, PSH, FHx, SHx, unless otherwise noted    ALLERGIES & MEDICATIONS  --------------------------------------------------------------------------------  Allergies    No Known Allergies    Intolerances      Standing Inpatient Medications  carvedilol 6.25 milliGRAM(s) Oral every 12 hours  citric acid/sodium citrate Solution 60 milliLiter(s) Oral three times a day  heparin   Injectable 5000 Unit(s) SubCutaneous every 8 hours  NIFEdipine XL 60 milliGRAM(s) Oral two times a day  tamsulosin 0.4 milliGRAM(s) Oral at bedtime    PRN Inpatient Medications  acetaminophen     Tablet .. 650 milliGRAM(s) Oral every 6 hours PRN      REVIEW OF SYSTEMS  --------------------------------------------------------------------------------  All other systems were reviewed and are negative, except as noted.    VITALS/PHYSICAL EXAM  --------------------------------------------------------------------------------  T(C): 36.7 (06-24-25 @ 11:36), Max: 36.8 (06-23-25 @ 19:45)  HR: 96 (06-24-25 @ 11:36) (92 - 107)  BP: 148/88 (06-24-25 @ 11:36) (144/100 - 169/126)  RR: 18 (06-24-25 @ 11:36) (18 - 18)  SpO2: 97% (06-24-25 @ 11:36) (97% - 100%)  Wt(kg): --  Height (cm): 167.6 (06-23-25 @ 22:00)  Weight (kg): 124 (06-23-25 @ 22:00)  BMI (kg/m2): 44.1 (06-23-25 @ 22:00)  BSA (m2): 2.28 (06-23-25 @ 22:00)        Physical Exam:  	Gen: NAD  	HEENT: Anicteric  	Pulm: CTA B/L  	CV: S1S2+  	Abd: Soft, +BS    	Ext: erythema and LE edema B/L  	Neuro: Awake  	Skin: Warm and dry        LABS/STUDIES  --------------------------------------------------------------------------------              10.0   8.15  >-----------<  166      [06-24-25 @ 06:32]              30.9     140  |  107  |  87  ----------------------------<  96      [06-24-25 @ 06:32]  4.3   |  15  |  7.15        Ca     7.3     [06-24-25 @ 06:32]      Mg     1.9     [06-24-25 @ 06:32]      Phos  5.5     [06-24-25 @ 06:32]    TPro  7.2  /  Alb  4.0  /  TBili  0.2  /  DBili  x   /  AST  14  /  ALT  16  /  AlkPhos  164  [06-24-25 @ 06:32]          Creatinine Trend:  SCr 7.15 [06-24 @ 06:32]  SCr 7.12 [06-23 @ 10:19]    Urinalysis - [06-24-25 @ 06:32]      Color  / Appearance  / SG  / pH       Gluc 96 / Ketone   / Bili  / Urobili        Blood  / Protein  / Leuk Est  / Nitrite       RBC  / WBC  / Hyaline  / Gran  / Sq Epi  / Non Sq Epi  / Bacteria           Tacrolimus  Cyclosporine  Sirolimus  Mycophenolate  BK PCR  CMV PCR  Parvo PCR  EBV PCR

## 2025-06-24 NOTE — DISCHARGE NOTE PROVIDER - PROVIDER TOKENS
PROVIDER:[TOKEN:[[id number]],FOLLOWUP:[2 weeks],ESTABLISHEDPATIENT:[T]],PROVIDER:[TOKEN:[[id number]],FOLLOWUP:[1 week],ESTABLISHEDPATIENT:[T]]

## 2025-06-24 NOTE — DISCHARGE NOTE PROVIDER - CARE PROVIDER_API CALL
Jw Mendez  Cardiovascular Disease  [address], [address]  [place] [address]  Phone: [phone]  Fax: [phone]  Established Patient  Follow Up Time: 2 weeks    Hu Benjamin  [facility]  [address]  [place] [address]  Phone: [phone]  Fax: [phone]  Established Patient  Follow Up Time: 1 week

## 2025-06-24 NOTE — PROGRESS NOTE ADULT - PROBLEM SELECTOR PLAN 2
Dyspnea with exertion  - CXR clear  - No crackles on PE  - pro BNP 1293    Plan:  - F/u TTE Dyspnea with exertion  - CXR clear  - No crackles, wheezing on PE  - pro BNP 1293    Plan:  - F/u TTE

## 2025-06-24 NOTE — PROGRESS NOTE ADULT - ASSESSMENT
50M with PMH of CKD, HTN sent for worsening swelling and redness and ROMAN on CKD.         ROMAN on advanced CKD  -Cr in 2017 was 2.1. About one year ago was about 4 (stable for 2 years after ?adrenal surgery), 2 months ago was ~ 6. Pt used to follow with Dr. Abigail Martinez (nephrology) but stopped follow up and stopped taking all meds as he felt fine.   -Pt endorses nocturia x3/night but also drinks fluids at night.   -In ER labs significant for Cr 7 (6/23), BUN 95, SCO2 14, rest ok  -UA +prtoein  -US showing small kindey--> R kidney 8.4cm and L kidney 7.9cm, no hydro  RECS:  -Obtain UPCR  -Cw bicitra 60 ml TID  -No diuretics for now, strict I/O  -If renal fx remain stable by tomorrow pt can follow up outpatient with nephrology.  **Please have patient call [phone] or email [email or web address] to make a followup appointment. Office located at [address] [place] [address].**          Kalen Coello  Nephrology Fellow  Feel free to contact me on TEAMS  After 5 pm and on weekends please contact the on-call Fellow. 50M with PMH of CKD, HTN sent for worsening swelling and redness and ROMAN on CKD.         ROMAN on advanced CKD  -Cr in 2017 was 2.1. About one year ago was about 4 (stable for 2 years after ?adrenal surgery), 2 months ago was ~ 6. Pt used to follow with Dr. Abigail Martinez (nephrology) but stopped follow up and stopped taking all meds as he felt fine.   -Pt endorses nocturia x3/night but also drinks fluids at night.   -In ER labs significant for Cr 7 (6/23), BUN 95, SCO2 14, rest ok  -UA +prtoein  -US showing small kindey--> R kidney 8.4cm and L kidney 7.9cm, no hydro  RECS:  -Obtain UPCR  -Cw bicitra 60 ml TID  -No diuretics for now, strict I/O  -If renal fx remain stable by tomorrow pt can follow up outpatient with nephrology.  **Please have patient call [phone] or email [email or web address] to make a followup appointment. Office located at [address] [place] [address].**      Anemia:   Hgb at goal.   Obtain Ferritin and Iron profile including transferrin saturation        Kalen Coello  Nephrology Fellow  Feel free to contact me on TEAMS  After 5 pm and on weekends please contact the on-call Fellow.

## 2025-06-24 NOTE — DISCHARGE NOTE PROVIDER - NSDCCPCAREPLAN_GEN_ALL_CORE_FT
PRINCIPAL DISCHARGE DIAGNOSIS  Diagnosis: ROMAN (acute kidney injury)  Assessment and Plan of Treatment: You presented to the hospital with a diagnosis of stage 4 chronic kidney disease. You were found to have elevated kidney levels and decreased kidney function which indicates acute kidney injury. The nephrology team was consulted and determined that you have stage 5 chronic kidney disease.   Please follow up with nephrology.      SECONDARY DISCHARGE DIAGNOSES  Diagnosis: Bilateral leg edema  Assessment and Plan of Treatment: You came to the hospital with swelling and redness of both legs. This is most likely related to your reduced kidney function. We did not start diuretics while you were in the hospital in order for the kidney team to evaluate the status of your kidney function first.    Diagnosis: Dyspnea on exertion  Assessment and Plan of Treatment: You came to the hospital with trouble breathing when walking and running. You did not have shortness of breath while resting. We obtained an echocardiogram to evaluate the function of your heart. It showed ----    Diagnosis: Hypertension  Assessment and Plan of Treatment: You came in with a diagnosis of high blood pressure. We continued the medications that you had been taking at home (Atenolol 100mg daily and Nifedipine XL 60mg twice a day). You continued to have high blood pressure so we switched your Atenolol to another blood pressure medication known as Carvedilol 6.25mg daily.    Diagnosis: Metabolic acidosis  Assessment and Plan of Treatment: In the emergency department, your blood pH was found to be low. This is likely due to your decreased kidney function. You were started on a medication called bicitrate, which improved your blood pH.    Diagnosis: Hypocalcemia  Assessment and Plan of Treatment:      PRINCIPAL DISCHARGE DIAGNOSIS  Diagnosis: ROMAN (acute kidney injury)  Assessment and Plan of Treatment: You presented to the hospital with a diagnosis of stage 4 chronic kidney disease. You were found to have elevated kidney levels and decreased kidney function which indicates acute kidney injury. The nephrology team was consulted and determined that you have stage 5 chronic kidney disease. Renal ultrasound showed evidence of chronic kidney dissease, and no issues with your bladder. You were not given diuretics as your kidney function got worse. You were started on a medication to lower the acidity in your blood. You underwent an echocardiogram, which showed normal heart function. You are now stable to be discharged but it is important to follow up with your PCP, Nephrology, and Cardiology to further manage your symptoms. Please immediately seek medical attention if you develop new shortness of breath, chest pain, or worsening legs swelling.  To do:  Please take ________ to lower the acidity in your blood  Please take Coreg 12.5 BID for your hypertension  STOP Atenolol      SECONDARY DISCHARGE DIAGNOSES  Diagnosis: Bilateral leg edema  Assessment and Plan of Treatment: You came to the hospital with swelling and redness of both legs. This is most likely related to your reduced kidney function. We did not start diuretics while you were in the hospital in order for the kidney team to evaluate the status of your kidney function first.    Diagnosis: Hypertension  Assessment and Plan of Treatment: You came in with a diagnosis of high blood pressure. We continued the medications that you had been taking at home (Atenolol 100mg daily and Nifedipine XL 60mg twice a day). You continued to have high blood pressure so we switched your Atenolol to another blood pressure medication known as Carvedilol 12.6mg BID.    Diagnosis: Metabolic acidosis  Assessment and Plan of Treatment: In the emergency department, your blood pH was found to be low. This is likely due to your decreased kidney function. You were started on a medication called bicitrate, which improved your blood pH. Please continue taking __________until you follow up with your Nephrologist outpatient     PRINCIPAL DISCHARGE DIAGNOSIS  Diagnosis: ROMAN (acute kidney injury)  Assessment and Plan of Treatment: You presented to the hospital with a diagnosis of stage 4 chronic kidney disease. You were found to have elevated kidney levels and decreased kidney function which indicates acute kidney injury. The nephrology team was consulted and determined that you have stage 5 chronic kidney disease. Renal ultrasound showed evidence of chronic kidney dissease, and no issues with your bladder. You were not given diuretics as your kidney function got worse. You were started on a medication to lower the acidity in your blood. You underwent an echocardiogram, which showed normal heart function. You are now stable to be discharged but it is important to follow up with your PCP, Nephrology, and Cardiology to further manage your symptoms. Please immediately seek medical attention if you develop new shortness of breath, chest pain, or worsening legs swelling.  To do:  Please take Bicitrate 60mL TID to lower the acidity in your blood  Please take Coreg 12.5 BID for your hypertension  STOP Atenolol      SECONDARY DISCHARGE DIAGNOSES  Diagnosis: Bilateral leg edema  Assessment and Plan of Treatment: You came to the hospital with swelling and redness of both legs. This is most likely related to your reduced kidney function. We did not start diuretics while you were in the hospital in order for the kidney team to evaluate the status of your kidney function first.    Diagnosis: Hypertension  Assessment and Plan of Treatment: You came in with a diagnosis of high blood pressure. We continued the medications that you had been taking at home (Atenolol 100mg daily and Nifedipine XL 60mg twice a day). You continued to have high blood pressure so we switched your Atenolol to another blood pressure medication known as Carvedilol 12.5mg BID.    Diagnosis: Metabolic acidosis  Assessment and Plan of Treatment: In the emergency department, your blood pH was found to be low. This is likely due to your decreased kidney function. You were started on a medication called bicitrate, which improved your blood pH. Please continue taking bicitrate until you follow up with your Nephrologist outpatient     PRINCIPAL DISCHARGE DIAGNOSIS  Diagnosis: ROMAN (acute kidney injury)  Assessment and Plan of Treatment: You presented to the hospital with a diagnosis of stage 4 chronic kidney disease. You were found to have elevated kidney levels and decreased kidney function which indicates acute kidney injury. The nephrology team was consulted and determined that you have stage 5 chronic kidney disease. Renal ultrasound showed evidence of chronic kidney dissease, and no issues with your bladder. You were not given diuretics as your kidney function got worse. You were started on a medication to lower the acidity in your blood. You underwent an echocardiogram, which showed normal heart function. You are now stable to be discharged but it is important to follow up with your PCP, Nephrology, and Cardiology to further manage your symptoms. Please immediately seek medical attention if you develop new shortness of breath, chest pain, or worsening legs swelling.  To do:  Please take Bicitrate 60mL TID to lower the acidity in your blood  Please take Coreg 12.5 BID for your hypertension  STOP Atenolol      SECONDARY DISCHARGE DIAGNOSES  Diagnosis: Bilateral leg edema  Assessment and Plan of Treatment: You came to the hospital with swelling and redness of both legs. This is most likely related to your reduced kidney function. We did not start diuretics while you were in the hospital in order for the kidney team to evaluate the status of your kidney function first.  You should follow-up with the nephrologists and come back if you develop significant shortness of breath.    Diagnosis: Hypertension  Assessment and Plan of Treatment: You came in with a diagnosis of high blood pressure. We continued the medications that you had been taking at home (Atenolol 100mg daily and Nifedipine XL 60mg twice a day). You continued to have high blood pressure so we switched your Atenolol to another blood pressure medication known as Carvedilol 12.5mg BID.    Diagnosis: Metabolic acidosis  Assessment and Plan of Treatment: In the emergency department, your blood pH was found to be low. This is likely due to your decreased kidney function. You were started on a medication called bicitrate, which improved your blood pH. Please continue taking bicitrate until you follow up with your Nephrologist outpatient

## 2025-06-24 NOTE — PROGRESS NOTE ADULT - PROBLEM SELECTOR PLAN 3
Hx of stage IV CKD. Follows outside nephrologist at [facility] (Dr. Martinez)-last appt was 1 yr ago. States he makes adequate urine. No dysuria. UA showed proteinuria and glucosuria. Stopped taking medications and following up with previous nephrologist.     - BUN:Cr 95:7.1    Plan:  - F/u bladder and kidney US (rule out obstructive nephropathy)  - Nephro consulted. Appreciate recs,    > Dx of Stage V CKD    > F/u Protein:Cr Ratio and Albumin:Cr Ratio    > Strict I&Os Hx of stage IV CKD. Follows outside nephrologist at [facility] (Dr. Martinez)-last appt was 1 yr ago. States he makes adequate urine. No dysuria. UA showed proteinuria and glucosuria. Stopped taking medications and following up with previous nephrologist.     - BUN:Cr 95 : 7.1    Plan:  - F/u bladder and kidney US:    > Bladder WNL    > Kidney increased echogenicity consistent with medical renal disease and b/l cysts  - Nephro consulted. Appreciate recs,    > Dx of Stage V CKD    > F/u Protein:Cr Ratio and Albumin:Cr Ratio    > Strict I&Os Hx of stage IV CKD. Follows outside nephrologist at [facility] (Dr. Martinez)-last appt was 1 yr ago. States he makes adequate urine. No dysuria. UA showed proteinuria and glucosuria. Stopped taking medications and following up with previous nephrologist.     - BUN:Cr 95 : 7.1    Plan:  - F/u bladder and kidney US:    > Bladder WNL    > Kidney increased echogenicity consistent with medical renal disease and b/l cysts  - Nephro consulted. Appreciate recs,    > Dx of Stage V CKD    > F/u Protein:Cr Ratio and Albumin:Cr Ratio    > Strict I&Os    -START tamsulosin 0.4mg

## 2025-06-24 NOTE — PROGRESS NOTE ADULT - PROBLEM SELECTOR PLAN 4
Secondary to CKD.    Plan:  - Nephro consulted. Appreciate recs: START Bicitra 60mL TID Secondary to CKD. Started on bicitra upon admission - acidosis improving    Plan:  - Nephro consulted. Appreciate recs: C/w Bicitra 60mL TID

## 2025-06-24 NOTE — DISCHARGE NOTE PROVIDER - CARE PROVIDERS DIRECT ADDRESSES
[email or web address]

## 2025-06-24 NOTE — PROGRESS NOTE ADULT - PROBLEM SELECTOR PLAN 5
Dx of malignant HTN. Self d/romelia multiple BP control meds. Still takes Atenolol 100mg    Plan:   - START Atenolol 100mg QD  - START Nifedipine ER 60mg q12h  - HOLD [id number]  - F/u Hba1c Hypocalcemia on admission to 7.6 and ionized Ca of 0.97. Albumin WNL. Phosphorus elevated to 5.5.    Plan:  - F/u 25-Vitamin D  - F/u PTH

## 2025-06-25 ENCOUNTER — TRANSCRIPTION ENCOUNTER (OUTPATIENT)
Age: 50
End: 2025-06-25

## 2025-06-25 VITALS
SYSTOLIC BLOOD PRESSURE: 142 MMHG | HEART RATE: 80 BPM | RESPIRATION RATE: 18 BRPM | DIASTOLIC BLOOD PRESSURE: 88 MMHG | TEMPERATURE: 98 F | OXYGEN SATURATION: 97 %

## 2025-06-25 PROBLEM — I10 ESSENTIAL (PRIMARY) HYPERTENSION: Chronic | Status: ACTIVE | Noted: 2025-06-23

## 2025-06-25 PROBLEM — N18.4 CHRONIC KIDNEY DISEASE, STAGE 4 (SEVERE): Chronic | Status: ACTIVE | Noted: 2025-06-23

## 2025-06-25 LAB
24R-OH-CALCIDIOL SERPL-MCNC: 15.5 NG/ML — LOW
ALBUMIN SERPL ELPH-MCNC: 3.8 G/DL — SIGNIFICANT CHANGE UP (ref 3.3–5)
ALBUMIN, RANDOM URINE: 66.2 MG/DL — SIGNIFICANT CHANGE UP
ALBUMIN/CREATININE RATIO (ACR): 1605 MG/G — HIGH (ref 0–30)
ALP SERPL-CCNC: 154 U/L — HIGH (ref 40–120)
ALT FLD-CCNC: 14 U/L — SIGNIFICANT CHANGE UP (ref 10–45)
ANION GAP SERPL CALC-SCNC: 16 MMOL/L — SIGNIFICANT CHANGE UP (ref 5–17)
AST SERPL-CCNC: 11 U/L — SIGNIFICANT CHANGE UP (ref 10–40)
BILIRUB SERPL-MCNC: 0.2 MG/DL — SIGNIFICANT CHANGE UP (ref 0.2–1.2)
BUN SERPL-MCNC: 88 MG/DL — HIGH (ref 7–23)
CALCIUM SERPL-MCNC: 7.1 MG/DL — LOW (ref 8.4–10.5)
CALCIUM SERPL-MCNC: 7.2 MG/DL — LOW (ref 8.4–10.5)
CHLORIDE SERPL-SCNC: 106 MMOL/L — SIGNIFICANT CHANGE UP (ref 96–108)
CO2 SERPL-SCNC: 17 MMOL/L — LOW (ref 22–31)
CREAT ?TM UR-MCNC: 41 MG/DL — SIGNIFICANT CHANGE UP
CREAT SERPL-MCNC: 6.88 MG/DL — HIGH (ref 0.5–1.3)
EGFR: 9 ML/MIN/1.73M2 — LOW
EGFR: 9 ML/MIN/1.73M2 — LOW
FERRITIN SERPL-MCNC: 225 NG/ML — SIGNIFICANT CHANGE UP (ref 30–400)
FOLATE SERPL-MCNC: 5.8 NG/ML — SIGNIFICANT CHANGE UP
GAS PNL BLDV: SIGNIFICANT CHANGE UP
GLUCOSE SERPL-MCNC: 103 MG/DL — HIGH (ref 70–99)
HCT VFR BLD CALC: 31.6 % — LOW (ref 39–50)
HGB BLD-MCNC: 9.9 G/DL — LOW (ref 13–17)
IRON SATN MFR SERPL: 17 % — SIGNIFICANT CHANGE UP (ref 16–55)
IRON SATN MFR SERPL: 44 UG/DL — LOW (ref 45–165)
MAGNESIUM SERPL-MCNC: 2 MG/DL — SIGNIFICANT CHANGE UP (ref 1.6–2.6)
MCHC RBC-ENTMCNC: 27.9 PG — SIGNIFICANT CHANGE UP (ref 27–34)
MCHC RBC-ENTMCNC: 31.3 G/DL — LOW (ref 32–36)
MCV RBC AUTO: 89 FL — SIGNIFICANT CHANGE UP (ref 80–100)
NRBC # BLD AUTO: 0 K/UL — SIGNIFICANT CHANGE UP (ref 0–0)
NRBC # FLD: 0 K/UL — SIGNIFICANT CHANGE UP (ref 0–0)
NRBC BLD AUTO-RTO: 0 /100 WBCS — SIGNIFICANT CHANGE UP (ref 0–0)
PHOSPHATE SERPL-MCNC: 5.6 MG/DL — HIGH (ref 2.5–4.5)
PLATELET # BLD AUTO: 173 K/UL — SIGNIFICANT CHANGE UP (ref 150–400)
PMV BLD: 10.7 FL — SIGNIFICANT CHANGE UP (ref 7–13)
POTASSIUM SERPL-MCNC: 4.7 MMOL/L — SIGNIFICANT CHANGE UP (ref 3.5–5.3)
POTASSIUM SERPL-SCNC: 4.7 MMOL/L — SIGNIFICANT CHANGE UP (ref 3.5–5.3)
PROT SERPL-MCNC: 7 G/DL — SIGNIFICANT CHANGE UP (ref 6–8.3)
PTH-INTACT FLD-MCNC: 463 PG/ML — HIGH (ref 15–65)
RBC # BLD: 3.55 M/UL — LOW (ref 4.2–5.8)
RBC # FLD: 15.3 % — HIGH (ref 10.3–14.5)
SODIUM SERPL-SCNC: 139 MMOL/L — SIGNIFICANT CHANGE UP (ref 135–145)
TIBC SERPL-MCNC: 258 UG/DL — SIGNIFICANT CHANGE UP (ref 220–430)
TRANSFERRIN SERPL-MCNC: 201 MG/DL — SIGNIFICANT CHANGE UP (ref 200–360)
UIBC SERPL-MCNC: 215 UG/DL — SIGNIFICANT CHANGE UP (ref 110–370)
VIT B12 SERPL-MCNC: 746 PG/ML — SIGNIFICANT CHANGE UP (ref 232–1245)
WBC # BLD: 8.01 K/UL — SIGNIFICANT CHANGE UP (ref 3.8–10.5)
WBC # FLD AUTO: 8.01 K/UL — SIGNIFICANT CHANGE UP (ref 3.8–10.5)

## 2025-06-25 PROCEDURE — 85014 HEMATOCRIT: CPT

## 2025-06-25 PROCEDURE — 82607 VITAMIN B-12: CPT

## 2025-06-25 PROCEDURE — 84156 ASSAY OF PROTEIN URINE: CPT

## 2025-06-25 PROCEDURE — 83690 ASSAY OF LIPASE: CPT

## 2025-06-25 PROCEDURE — 84132 ASSAY OF SERUM POTASSIUM: CPT

## 2025-06-25 PROCEDURE — 82728 ASSAY OF FERRITIN: CPT

## 2025-06-25 PROCEDURE — 82306 VITAMIN D 25 HYDROXY: CPT

## 2025-06-25 PROCEDURE — 83735 ASSAY OF MAGNESIUM: CPT

## 2025-06-25 PROCEDURE — 83540 ASSAY OF IRON: CPT

## 2025-06-25 PROCEDURE — 83550 IRON BINDING TEST: CPT

## 2025-06-25 PROCEDURE — 81001 URINALYSIS AUTO W/SCOPE: CPT

## 2025-06-25 PROCEDURE — 76770 US EXAM ABDO BACK WALL COMP: CPT

## 2025-06-25 PROCEDURE — 99239 HOSP IP/OBS DSCHRG MGMT >30: CPT | Mod: GC

## 2025-06-25 PROCEDURE — 82803 BLOOD GASES ANY COMBINATION: CPT

## 2025-06-25 PROCEDURE — 83970 ASSAY OF PARATHORMONE: CPT

## 2025-06-25 PROCEDURE — 84484 ASSAY OF TROPONIN QUANT: CPT

## 2025-06-25 PROCEDURE — 84145 PROCALCITONIN (PCT): CPT

## 2025-06-25 PROCEDURE — 93005 ELECTROCARDIOGRAM TRACING: CPT

## 2025-06-25 PROCEDURE — 82310 ASSAY OF CALCIUM: CPT

## 2025-06-25 PROCEDURE — 82330 ASSAY OF CALCIUM: CPT

## 2025-06-25 PROCEDURE — 71046 X-RAY EXAM CHEST 2 VIEWS: CPT

## 2025-06-25 PROCEDURE — 85018 HEMOGLOBIN: CPT

## 2025-06-25 PROCEDURE — C8929: CPT

## 2025-06-25 PROCEDURE — 85025 COMPLETE CBC W/AUTO DIFF WBC: CPT

## 2025-06-25 PROCEDURE — 83036 HEMOGLOBIN GLYCOSYLATED A1C: CPT

## 2025-06-25 PROCEDURE — 80053 COMPREHEN METABOLIC PANEL: CPT

## 2025-06-25 PROCEDURE — 83880 ASSAY OF NATRIURETIC PEPTIDE: CPT

## 2025-06-25 PROCEDURE — 84100 ASSAY OF PHOSPHORUS: CPT

## 2025-06-25 PROCEDURE — 82435 ASSAY OF BLOOD CHLORIDE: CPT

## 2025-06-25 PROCEDURE — 82947 ASSAY GLUCOSE BLOOD QUANT: CPT

## 2025-06-25 PROCEDURE — 85027 COMPLETE CBC AUTOMATED: CPT

## 2025-06-25 PROCEDURE — 99285 EMERGENCY DEPT VISIT HI MDM: CPT | Mod: 25

## 2025-06-25 PROCEDURE — 83605 ASSAY OF LACTIC ACID: CPT

## 2025-06-25 PROCEDURE — 82746 ASSAY OF FOLIC ACID SERUM: CPT

## 2025-06-25 PROCEDURE — 80061 LIPID PANEL: CPT

## 2025-06-25 PROCEDURE — 84295 ASSAY OF SERUM SODIUM: CPT

## 2025-06-25 PROCEDURE — 82570 ASSAY OF URINE CREATININE: CPT

## 2025-06-25 PROCEDURE — 82043 UR ALBUMIN QUANTITATIVE: CPT

## 2025-06-25 PROCEDURE — 84466 ASSAY OF TRANSFERRIN: CPT

## 2025-06-25 RX ORDER — TAMSULOSIN HYDROCHLORIDE 0.4 MG/1
1 CAPSULE ORAL
Qty: 30 | Refills: 0
Start: 2025-06-25 | End: 2025-07-24

## 2025-06-25 RX ORDER — CARVEDILOL 3.12 MG/1
1 TABLET, FILM COATED ORAL
Qty: 60 | Refills: 0
Start: 2025-06-25 | End: 2025-07-24

## 2025-06-25 RX ORDER — CITRIC ACID/SODIUM CITRATE 300-500 MG
60 SOLUTION, ORAL ORAL
Qty: 5400 | Refills: 0
Start: 2025-06-25 | End: 2025-07-24

## 2025-06-25 RX ORDER — LISINOPRIL 30 MG/1
1 TABLET ORAL
Refills: 0 | DISCHARGE

## 2025-06-25 RX ADMIN — Medication 60 MILLILITER(S): at 13:45

## 2025-06-25 RX ADMIN — Medication 60 MILLILITER(S): at 06:25

## 2025-06-25 RX ADMIN — NICOTINE POLACRILEX 1 PATCH: 4 GUM, CHEWING ORAL at 00:00

## 2025-06-25 RX ADMIN — Medication 1 APPLICATION(S): at 05:30

## 2025-06-25 RX ADMIN — CARVEDILOL 12.5 MILLIGRAM(S): 3.12 TABLET, FILM COATED ORAL at 05:30

## 2025-06-25 RX ADMIN — Medication 60 MILLIGRAM(S): at 05:30

## 2025-06-25 NOTE — PROGRESS NOTE ADULT - PROBLEM SELECTOR PLAN 5
Hypocalcemia on admission to 7.6 and ionized Ca of 0.97. Albumin WNL. Phosphorus elevated to 5.5.    Plan:  - F/u 25-Vitamin D  - F/u PTH Hypocalcemia on admission to 7.6 and ionized Ca of 0.97. Albumin WNL. Phosphorus elevated to 5.5.    Plan:  - F/u 25-Vitamin D: 15.5  - F/u B12: 764  - F/u PTH: 463

## 2025-06-25 NOTE — PROGRESS NOTE ADULT - PROBLEM SELECTOR PLAN 6
Dx of malignant HTN. Self d/romelia multiple BP control meds. Still takes Atenolol 100mg    Plan:   - STOP Atenolol 100mg QD -> START Carvedilol 6.25mg QD  - C/w Nifedipine ER 60mg q12h  - HOLD [id number]  - F/u Hba1c: 5.3  - F/u Lipid panel: Triglycerides to 179, LDL WNL - statins not indicated Dx of malignant HTN. Self d/romelia multiple BP control meds. Still takes Atenolol 100mg    Plan:   - STOP Atenolol 100mg QD -> STOP Carvedilol 6.25mg QD -> START Carvedilol 12.5 mg BID  - C/w Nifedipine ER 60mg q12h  - HOLD [id number]  - F/u Hba1c: 5.3  - F/u Lipid panel: Triglycerides to 179, LDL WNL - statins not indicated

## 2025-06-25 NOTE — PROGRESS NOTE ADULT - PROBLEM SELECTOR PLAN 1
chronic l/l LE edema and erythema since 04/25 with acute worsening in the past few days.    Plan:  - Nephro consulted. Appreciate recs:     >Can hold off on diuretics since no SOB. Monitor renal function first. chronic b/l LE edema and erythema since 04/25 with acute worsening in the past few days.    Plan:  - Nephro consulted. Appreciate recs:     >Can hold off on diuretics since no SOB. Monitor renal function first.    >UPCR: 2.7    >ACr: 1605

## 2025-06-25 NOTE — DISCHARGE NOTE NURSING/CASE MANAGEMENT/SOCIAL WORK - NSDCPEEMAIL_GEN_ALL_CORE
[facility] email [email or web address]

## 2025-06-25 NOTE — PROGRESS NOTE ADULT - SUBJECTIVE AND OBJECTIVE BOX
PROGRESS NOTE:     Patient is a 50y old  Male who presents with a chief complaint of LE swelling and erythema (24 Jun 2025 14:05)      SUBJECTIVE / OVERNIGHT EVENTS:    OVERNIGHT: No acute overnight events.      Patient was examined at bedside and feels well this morning. Denies fever, chills, chest pain, SOB, nausea, vomiting. ROS otherwise negative and pt is amenable to current treatment plan.      REVIEW OF SYSTEMS:    CONSTITUTIONAL:  No weakness, fevers, or chills  EYES/ENT:  No visual changes, vertigo, or throat pain   NECK:  No pain or stiffness  RESPIRATORY:  No SOB, cough, wheezing, or hemoptysis  CARDIOVASCULAR:  No chest pain or palpitations  GASTROINTESTINAL:  No abdominal pain, nausea, vomiting, or hematemesis; no diarrhea, constipation, melena, or hematochezia  GENITOURINARY:  No dysuria, polyuria, or hematuria  NEUROLOGICAL:  No numbness or weakness  SKIN:  No itching or rashes      MEDICATIONS  (STANDING):  ammonium lactate 12% Lotion 1 Application(s) Topical two times a day  carvedilol 12.5 milliGRAM(s) Oral every 12 hours  citric acid/sodium citrate Solution 60 milliLiter(s) Oral three times a day  heparin   Injectable 5000 Unit(s) SubCutaneous every 8 hours  NIFEdipine XL 60 milliGRAM(s) Oral two times a day  tamsulosin 0.4 milliGRAM(s) Oral at bedtime    MEDICATIONS  (PRN):  acetaminophen     Tablet .. 650 milliGRAM(s) Oral every 6 hours PRN Temp greater or equal to 38C (100.4F), Mild Pain (1 - 3)      CAPILLARY BLOOD GLUCOSE        I&O's Summary      PHYSICAL EXAM:  Vital Signs Last 24 Hrs  T(C): 36.6 (25 Jun 2025 05:30), Max: 36.7 (24 Jun 2025 11:36)  T(F): 97.9 (25 Jun 2025 05:30), Max: 98 (24 Jun 2025 11:36)  HR: 81 (25 Jun 2025 05:30) (76 - 96)  BP: 140/94 (25 Jun 2025 05:30) (140/94 - 166/107)  BP(mean): --  RR: 18 (25 Jun 2025 05:30) (18 - 18)  SpO2: 98% (25 Jun 2025 05:30) (97% - 98%)    Parameters below as of 25 Jun 2025 05:30  Patient On (Oxygen Delivery Method): room air        CONSTITUTIONAL: Well groomed, no apparent distress  EYES: PERRLA and symmetric, EOMI, No conjunctival or scleral injection, non-icteric  ENMT: Oral mucosa with moist membranes.              NECK: Supple, symmetric and without tracheal deviation   RESP: No respiratory distress, no use of accessory muscles; CTA b/l but decreased breath sounds (likely due to body habitus)  CV: RRR, +S1S2, no JVD; + peripheral edema (b/l LE edema)  GI: Soft, NT, ND, no rebound, no guarding;   SKIN: B/l erythema of LE   NEURO: CN II-XII intact;   PSYCH: Appropriate insight/judgment; A+O x 3, mood and affect appropriate, recent/remote memory intact    LABS:                        9.9    8.01  )-----------( 173      ( 25 Jun 2025 06:16 )             31.6     06-25    139  |  106  |  88[H]  ----------------------------<  103[H]  4.7   |  17[L]  |  6.88[H]    Ca    7.2[L]      25 Jun 2025 06:16  Phos  5.6     06-25  Mg     2.0     06-25    TPro  7.0  /  Alb  3.8  /  TBili  0.2  /  DBili  x   /  AST  11  /  ALT  14  /  AlkPhos  154[H]  06-25          Urinalysis Basic - ( 25 Jun 2025 06:16 )    Color: x / Appearance: x / SG: x / pH: x  Gluc: 103 mg/dL / Ketone: x  / Bili: x / Urobili: x   Blood: x / Protein: x / Nitrite: x   Leuk Esterase: x / RBC: x / WBC x   Sq Epi: x / Non Sq Epi: x / Bacteria: x          RADIOLOGY & ADDITIONAL TESTS:    N/A PROGRESS NOTE:     Patient is a 50y old  Male who presents with a chief complaint of LE swelling and erythema (24 Jun 2025 14:05)      SUBJECTIVE / OVERNIGHT EVENTS:    INTERVAL/OVERNIGHT: No acute overnight events.  - Carvedilol increased to 12.5mg BID   - TTE obtained yesterday     Patient was examined at bedside and feels well this morning. B/l LE edema mildly improved. Denies fever, chills, chest pain, SOB, nausea, vomiting. ROS otherwise negative and pt is amenable to current treatment plan.       REVIEW OF SYSTEMS:    CONSTITUTIONAL:  No weakness, fevers, or chills  EYES/ENT:  No visual changes, vertigo, or throat pain   NECK:  No pain or stiffness  RESPIRATORY:  No SOB, cough, wheezing, or hemoptysis  CARDIOVASCULAR:  No chest pain or palpitations  GASTROINTESTINAL:  No abdominal pain, nausea, vomiting, or hematemesis; no diarrhea, constipation, melena, or hematochezia  GENITOURINARY:  No dysuria, polyuria, or hematuria  NEUROLOGICAL:  No numbness or weakness  SKIN:  No itching or rashes      MEDICATIONS  (STANDING):  ammonium lactate 12% Lotion 1 Application(s) Topical two times a day  carvedilol 12.5 milliGRAM(s) Oral every 12 hours  citric acid/sodium citrate Solution 60 milliLiter(s) Oral three times a day  heparin   Injectable 5000 Unit(s) SubCutaneous every 8 hours  NIFEdipine XL 60 milliGRAM(s) Oral two times a day  tamsulosin 0.4 milliGRAM(s) Oral at bedtime    MEDICATIONS  (PRN):  acetaminophen     Tablet .. 650 milliGRAM(s) Oral every 6 hours PRN Temp greater or equal to 38C (100.4F), Mild Pain (1 - 3)      CAPILLARY BLOOD GLUCOSE        I&O's Summary      PHYSICAL EXAM:  Vital Signs Last 24 Hrs  T(C): 36.6 (25 Jun 2025 05:30), Max: 36.7 (24 Jun 2025 11:36)  T(F): 97.9 (25 Jun 2025 05:30), Max: 98 (24 Jun 2025 11:36)  HR: 81 (25 Jun 2025 05:30) (76 - 96)  BP: 140/94 (25 Jun 2025 05:30) (140/94 - 166/107)  BP(mean): --  RR: 18 (25 Jun 2025 05:30) (18 - 18)  SpO2: 98% (25 Jun 2025 05:30) (97% - 98%)    Parameters below as of 25 Jun 2025 05:30  Patient On (Oxygen Delivery Method): room air        CONSTITUTIONAL: Well groomed, no apparent distress  EYES: PERRLA and symmetric, EOMI, No conjunctival or scleral injection, non-icteric  ENMT: Oral mucosa with moist membranes.              NECK: Supple, symmetric and without tracheal deviation   RESP: No respiratory distress, no use of accessory muscles; CTA b/l but decreased breath sounds (likely due to body habitus)  CV: RRR, +S1S2, no JVD; +b/l LE edema  GI: Soft, NT, ND, no rebound, no guarding;   SKIN: B/l erythema of LE   NEURO: CN II-XII intact;   PSYCH: Appropriate insight/judgment; A+O x 3, mood and affect appropriate, recent/remote memory intact    LABS:                        9.9    8.01  )-----------( 173      ( 25 Jun 2025 06:16 )             31.6     06-25    139  |  106  |  88[H]  ----------------------------<  103[H]  4.7   |  17[L]  |  6.88[H]    Ca    7.2[L]      25 Jun 2025 06:16  Phos  5.6     06-25  Mg     2.0     06-25    TPro  7.0  /  Alb  3.8  /  TBili  0.2  /  DBili  x   /  AST  11  /  ALT  14  /  AlkPhos  154[H]  06-25          Urinalysis Basic - ( 25 Jun 2025 06:16 )    Color: x / Appearance: x / SG: x / pH: x  Gluc: 103 mg/dL / Ketone: x  / Bili: x / Urobili: x   Blood: x / Protein: x / Nitrite: x   Leuk Esterase: x / RBC: x / WBC x   Sq Epi: x / Non Sq Epi: x / Bacteria: x          RADIOLOGY & ADDITIONAL TESTS:    TRANSTHORACIC ECHOCARDIOGRAM REPORT  ________________________________________________________________________________                                      _______  Pt. Name:       BENI INGRAM Study Date:    6/24/2025  MRN:            [id number]     YOB: 1975  Accession #:    [id number]      Age:           50 years  Account#:       [id number]   Gender:        M  Heart Rate:     73 bpm         Height:        65.00 in (165.10 cm)  Rhythm:                        Weight:        270.00 lb (122.47 kg)  Blood Pressure: 144/100 mmHg   BSA/BMI:       2.25 m² / 44.93 kg/m²  ________________________________________________________________________________________  Referring Physician:    [id number] Jackson Alonzo  Interpreting Physician: Lei Thompson MD  Primary Sonographer:    Moris Cabrales RDCS    CPT:                ECHO TTE WITH CON COMP W DOPP - .m;DEFINITY ECHO                      CONTRAST PER ML - .m;DEFINITY ECHO CONTRAST PER ML                      WASTED - .m  Indication(s):      Edema, unspecified - R60.9  Procedure:          Transthoracic echocardiogram with 2-D, M-mode and complete                      spectral and color flow Doppler.  Ordering Location:  [facility]  Admission Status:   Inpatient  Contrast Injection: Verbal consent was obtained for injection of Ultrasonic                      Enhancing Agent following a discussion of risks and                      benefits.                      Endocardial visualization enhanced with 2 ml of Definity                      Ultrasound enhancing agent (Lot#:[id number] Exp.Date:2026-MAR                      Discarded Dose:8ml).  UEA Reaction:       Patient had no adverse reaction after injection of                      Ultrasound Enhancing Agent.  Study Information:  Image quality for this study is adequate.    _______________________________________________________________________________________     CONCLUSIONS:      1. Left ventricular systolic function is normal with an ejection fraction of 70 % by Cedeno's method of disks. There are no regional wall motion abnormalities seen.   2. Normal right ventricular cavity size and normal right ventricular systolic function.   3. Mild mitral valve stenosis.   4. Ascending aorta is dilated, measuring 3.90 cm (indexed 1.74 cm/m²).   5. No pericardial effusion seen.   6. No prior echocardiogram is available for comparison.    ________________________________________________________________________________________  FINDINGS:     Left Ventricle:  The left ventricular cavity is normal in size. Left ventricular wall thickness is mildly increased. Left ventricular systolic function is normal with a calculated ejection fraction of 70 % by the Cedeno's biplane method of disks. There are no regional wall motion abnormalities seen. There is no evidence of a left ventricular thrombus. The left ventricular diastolic function is indeterminate. Analysis of left ventricular diastolic function and filling pressure is made challenging by the presence of mitral annular calcification.     Right Ventricle:  The right ventricular cavity is normal in size and right ventricular systolic function is normal. Tricuspid annular plane systolic excursion (TAPSE) is 2.3 cm (normal >=1.7 cm). Tricuspid annular tissue Doppler S' is 12.7 cm/s (normal >10 cm/s).     Left Atrium:  The left atrium is mildly dilated with an indexed volume of 38.00 ml/m².     Right Atrium:  The right atrium is normal in size with an indexed volume of 19.05 ml/m² and an indexed area of 8.28 cm²/m².     Interatrial Septum:  Interatrial septum is aneurysmal. There is no evidence of a patent foramen ovale by color flow Doppler.     Aortic Valve:  The aortic valve anatomy cannot be determined. There is no aortic valve stenosis. The aortic valve acceleration time is 85 msec. There is no evidence of aortic regurgitation.     Mitral Valve:  Structurally normal mitral valve with normal leaflet excursion. There is calcification of the mitral valve annulus. There is mild mitral valve stenosis, secondary to dystrophic mitral annular calcification. There is trace mitral regurgitation.     Tricuspid Valve:  The tricuspid valve was not well visualized. There is trace tricuspid regurgitation.     Pulmonic Valve:  The pulmonic valve was not well visualized. There is no pulmonic valve stenosis.     Aorta:  The aortic root is not well visualized. The aortic arch appears normal in size. The ascending aorta is dilated, measuring 3.90 cm (indexed 1.74 cm/m²).     Pericardium:  No pericardial effusion seen.     Systemic Veins:  The inferior vena cava is normal in size measuring 1.55 cm in diameter, (normal <2.1cm) with normal inspiratory collapse (normal >50%) consistent with normal right atrial pressure (~3, range 0-5mmHg).  ____________________________________________________________________  QUANTITATIVE DATA:  Left Ventricle Measurements: (Indexed to BSA)     IVSd (2D):   1.1 cm  LVPWd (2D):  1.0 cm  LVIDd (2D):  5.3 cm  LVIDs (2D):  3.8 cm  LV Mass:     217 g  96.4 g/m²  LV Vol d, MOD A2C: 130.0 ml 57.85 ml/m²  LV Vol d, MOD A4C: 135.0 ml 60.07 ml/m²  LV Vol d, MOD BP:  135.8 ml 60.41 ml/m²  LV Vol s, MOD A2C: 36.9 ml  16.42 ml/m²  LV Vol s, MOD A4C: 40.2 ml  17.89 ml/m²  LV Vol s, MOD BP:  40.1 ml  17.82 ml/m²  LVOT SV MOD BP:    95.7 ml  LV EF% MOD BP:     70 %     MV E Vmax:    1.03 m/s  MV A Vmax:    1.30 m/s  MV E/A:       0.79  e' lateral:   5.52 cm/s  e' medial:    5.52 cm/s  E/e' lateral: 18.66  E/e' medial:  18.66  E/e' Average: 18.66    Aorta Measurements: (Normal range) (Indexed to BSA)     Ao Asc d, 2D: 3.90  Ao Asc prox:  3.90 cm 1.74 cm/m²  Ao Arch:      2.9 cm    Left Atrium Measurements: (Indexed to BSA)  LA Diam 2D:        4.50 cm  LA Vol s, MOD A4C: 84.20 ml.  LA Vol s, MOD A2C: 85.00 ml.  LA Vol s, MOD BP:  85.40 ml  38.00 ml/m²      Right Ventricle Measurements: Right Atrial Measurements:     TAPSE:           2.3 cm       RA Vol s, MOD A4C         42.8 ml  RV Base (RVID1): 4.0 cm       RA Vol s, MOD A4C i BSA   19.05 ml/m²  RV Mid (RVID2):  2.6 cm       RA Area s, MOD A4C        18.6 cm²                                RA Area s, MOD A4C, i BSA 8.28 cm²/m²       LVOT / RVOT/ Qp/Qs Data: (Indexed to BSA)  LVOT Diameter,s: 2.50 cm  LVOT Area:       4.91 cm²  LVOT Vmax:       1.03 m/s  LVOT Vmn:        0.724 m/s  LVOT VTI:        22.20 cm  LVOT peak grad:  4 mmHg  LVOT mean grad:  2.0 mmHg  LVOT SV:         109.0 ml  48.49 ml/m²    Aortic Valve Measurements:  AV Vmax:                1.4 m/s  AV Peak Gradient:       7.9 mmHg  AV Mean Gradient:       4.0 mmHg  AV VTI:                 28.4 cm  AV VTI Ratio:           0.78  AoV EOA, Contin:        3.84 cm²  AoV EOA, Contin i:      1.71 cm²/m²  AoV area, (CE):         3.84 cm²  AoV area, (CE), i:      1.71 cm²/m²  AoV Dimensionless Index 0.78    Mitral Valve Measurements:     MV Vmax:      1.54 m/s  MV VTI, lainey   0.378 m  MV Mean Grad: 4.0 mmHg  MV Peak Grad: 9.5 mmHg  MV E Vmax:    1.0 m/s  MV A Vmax:    1.3 m/s  MV E/A:       0.8       Tricuspid Valve Measurements:     TV S'        12.7 cm/s  RA Pressure: 3 mmHg  Pulmonic Valve Measurments:  PV Vmax:          0.8 m/s  PV Peak Gradient: 2.9 mmHg    ________________________________________________________________________________________  Electronically signed on 6/24/2025 at 1:39:01 PM by Lei Thompson MD    *** Final ***

## 2025-06-25 NOTE — PROGRESS NOTE ADULT - ASSESSMENT
RL is a 50 year old M with PMH of Stage 4 CKD (now stage V on this admission), malignant HTN, LV hypertrophy presents to [facility] referred by his cardiologist Dr. Guillen after pt's appointment today for acute on chronic worsening b/l lower extremity edema and erythema (L>R) and dyspnea on exertion. Labs showed elevated proBNP, AG metabolic acidosis, BUN:Cr of 95:1.12 representative of ROMAN on CKD, normal WBC. Presentation is concerning for CHF vs. less likely cellulitis compounded with ROMAN on CKD.

## 2025-06-25 NOTE — PROGRESS NOTE ADULT - PROBLEM SELECTOR PLAN 7
- Subq hep 5000u q8h  - Diet: renal restrictions  - Dipso: Pending
- Subq hep 5000u q8h  - Diet: renal restrictions  - Dipso: Pending

## 2025-06-25 NOTE — DISCHARGE NOTE NURSING/CASE MANAGEMENT/SOCIAL WORK - FINANCIAL ASSISTANCE
[facility] provides services at a reduced cost to those who are determined to be eligible through [facility]’s financial assistance program. Information regarding [facility]’s financial assistance program can be found by going to [email or web address] or by calling [phone].

## 2025-06-25 NOTE — DISCHARGE NOTE NURSING/CASE MANAGEMENT/SOCIAL WORK - NSDCPEWEB_GEN_ALL_CORE
[facility] for Tobacco Control website --- [email or web address] --- [email or web address]

## 2025-06-25 NOTE — DISCHARGE NOTE NURSING/CASE MANAGEMENT/SOCIAL WORK - NSDCFUADDAPPT_GEN_ALL_CORE_FT
APPTS ARE READY TO BE MADE: [x] YES    Best Family or Patient Contact (if needed):    Additional Information about above appointments (if needed):    1: Nephrology  2: Cardiology  3: PCP    Other comments or requests:

## 2025-06-25 NOTE — PROGRESS NOTE ADULT - ATTENDING COMMENTS
# CKD stage V  Serum creatinine in the mid 4s in 2023. He stopped follow up with his nephrologist and medications. In April 2025, he went to PCP's office because of lower extremity swelling. Blood test showed that serum creatinine was 6. Now, serum creatinine is 7.  Check urine protein:creat ratio and albumin:creatinine ratio.   US kidney - small kidneys. no hydronephrosis, medical renal disease.  Outpatient follow up with Dr. Matta ([phone]) - we will secure appointment before patient gets discharged.     # HTN  Patient has not been taking his BP meds because he did not have any symptoms. He started resuming nifedipine in April 2025 after seeing his PCP (Dr. Benjamin). He said that his SBP was in the 180s then. Continue nifedipine and carvedilol.     # Metabolic acidosis  - secondary to CKD.   - Start bicitra 60m TID.    # LE swelling  - Chronic since APril 2025.  - US duplex of legs - DVT ruled out.   - Since patient does not have any shortness of breath, we can hold off diuretics for now.   - discussed about a low sodium    The rest of the recommendations as per fellow's note.    Estefani Montiel MD  Attending Nephrologist  [phone] or via [employer] .
Patient is a 50 year old male, with PMH of Stage 4 CKD, malignant HTN, LV hypertrophy, who presented to [facility] referred by his cardiologist Dr. Guillen for acute on chronic worsening b/l lower extremity edema and erythema (L>R) and dyspnea on exertion. Labs showed elevated proBNP, AG metabolic acidosis, ROMAN on CKD, normal WBC. Presentation is concerning for CHF vs. cellulitis with ROMAN on CKD.    - monitor creatinine daily   - renal consulted; recs appreciated; hold off on diuretics for now; started on bicitra  - renal US neg for hydro; signs of MRD    - ECHO with normal EF   - monitor O2 sats; currently on room air  - patient with recent [facility] duplex done negative for DVT; will hold off on repeat duplex at this time  - monitor pH; overall improved   - change atenolol to coreg; continue nifedipine 60mg BID; can increase coreg as needed if BP remains elevated; monitor BP and adjust meds as needed   - ok for DC per renal on bicitra (or can give sodium bicarb 1900mg TID) and coreg     DISPO: DC planning to home today  40 mins spent on DC planning      Rest as above. Discussed with HS.
Patient is a 50 year old male, with PMH of Stage 4 CKD, malignant HTN, LV hypertrophy, who presented to [facility] referred by his cardiologist Dr. Guillen for acute on chronic worsening b/l lower extremity edema and erythema (L>R) and dyspnea on exertion. Labs showed elevated proBNP, AG metabolic acidosis, ROMAN on CKD, normal WBC. Presentation is concerning for CHF vs. cellulitis with ROMAN on CKD.    - monitor creatinine daily   - renal consulted; recs appreciated; hold off on diuretics for now; started on bicitra  - renal US neg for hydro; signs of MRD    - obtain urine PC ratio   - obtain ECHO   - monitor O2 sats; currently on room air  - patient with recent [facility] duplex done negative for DVT; will hold off on repeat duplex at this time  - repeat VBG in AM   - change atenolol to coreg; continue nifedipine 60mg BID; can increase coreg as needed if BP remains elevated; monitor BP and adjust meds as needed   - obtain Vit D, PTH level     Rest as above. Discussed with HS.

## 2025-06-25 NOTE — DISCHARGE NOTE NURSING/CASE MANAGEMENT/SOCIAL WORK - PATIENT PORTAL LINK FT
You can access the FollowMyHealth Patient Portal offered by [facility] by registering at the following website: [email or web address]. By joining [facility]’s FollowMyHealth portal, you will also be able to view your health information using other applications (apps) compatible with our system.

## 2025-06-25 NOTE — PROGRESS NOTE ADULT - PROBLEM SELECTOR PLAN 3
Hx of stage IV CKD. Follows outside nephrologist at [facility] (Dr. Martinez)-last appt was 1 yr ago. States he makes adequate urine. No dysuria. UA showed proteinuria and glucosuria. Stopped taking medications and following up with previous nephrologist.     - BUN:Cr 95 : 7.1    Plan:  - F/u bladder and kidney US:    > Bladder WNL    > Kidney increased echogenicity consistent with medical renal disease and b/l cysts  - Nephro consulted. Appreciate recs,    > Dx of Stage V CKD    > F/u Protein:Cr Ratio and Albumin:Cr Ratio    > Strict I&Os    -START tamsulosin 0.4mg Hx of stage IV CKD. Follows outside nephrologist at [facility] (Dr. Martinez)-last appt was 1 yr ago. States he makes adequate urine. No dysuria. UA showed proteinuria and glucosuria. Stopped taking medications and following up with previous nephrologist.     - BUN:Cr 95 : 7.1    Plan:  - F/u bladder and kidney US:    > Bladder WNL    > Kidney increased echogenicity consistent with medical renal disease and b/l cysts  - Nephro consulted. Appreciate recs,    > Dx of Stage V CKD    > F/u Protein:Cr Ratio and Albumin:Cr Ratio    > Strict I&Os    -C/w tamsulosin 0.4mg

## 2025-06-25 NOTE — PROGRESS NOTE ADULT - PROBLEM SELECTOR PLAN 4
Secondary to CKD. Started on bicitra upon admission - acidosis improving    Plan:  - Nephro consulted. Appreciate recs: C/w Bicitra 60mL TID

## 2025-06-25 NOTE — PROGRESS NOTE ADULT - PROBLEM SELECTOR PLAN 2
Dyspnea with exertion  - CXR clear  - No crackles, wheezing on PE  - pro BNP 1293    Plan:  - F/u TTE Dyspnea with exertion  - CXR clear  - No crackles, wheezing on PE  - pro BNP 1293    Plan:  - F/u TTE: EF 70%, dilated aorta to 3.90 cm, aneurysmic interatrial septum

## 2025-06-27 ENCOUNTER — APPOINTMENT (OUTPATIENT)
Dept: CARDIOLOGY | Facility: CLINIC | Age: 50
End: 2025-06-27
Payer: COMMERCIAL

## 2025-06-27 VITALS
OXYGEN SATURATION: 98 % | HEART RATE: 86 BPM | BODY MASS INDEX: 43.58 KG/M2 | SYSTOLIC BLOOD PRESSURE: 120 MMHG | WEIGHT: 270 LBS | DIASTOLIC BLOOD PRESSURE: 80 MMHG

## 2025-06-27 PROCEDURE — G2211 COMPLEX E/M VISIT ADD ON: CPT | Mod: NC

## 2025-06-27 PROCEDURE — 99215 OFFICE O/P EST HI 40 MIN: CPT

## 2025-06-30 ENCOUNTER — APPOINTMENT (OUTPATIENT)
Dept: INTERNAL MEDICINE | Facility: CLINIC | Age: 50
End: 2025-06-30
Payer: COMMERCIAL

## 2025-06-30 ENCOUNTER — LABORATORY RESULT (OUTPATIENT)
Age: 50
End: 2025-06-30

## 2025-06-30 VITALS
DIASTOLIC BLOOD PRESSURE: 90 MMHG | BODY MASS INDEX: 43.07 KG/M2 | WEIGHT: 268 LBS | TEMPERATURE: 97.8 F | HEART RATE: 85 BPM | HEIGHT: 66 IN | SYSTOLIC BLOOD PRESSURE: 138 MMHG | OXYGEN SATURATION: 98 %

## 2025-06-30 PROBLEM — Z09 HOSPITAL DISCHARGE FOLLOW-UP: Status: ACTIVE | Noted: 2025-06-30

## 2025-06-30 PROCEDURE — 36415 COLL VENOUS BLD VENIPUNCTURE: CPT

## 2025-06-30 PROCEDURE — 99496 TRANSJ CARE MGMT HIGH F2F 7D: CPT

## 2025-06-30 RX ORDER — CARVEDILOL 12.5 MG/1
12.5 TABLET, FILM COATED ORAL
Qty: 180 | Refills: 0 | Status: ACTIVE | COMMUNITY
Start: 2025-06-30 | End: 1900-01-01

## 2025-07-01 RX ORDER — TIRZEPATIDE 2.5 MG/.5ML
2.5 INJECTION, SOLUTION SUBCUTANEOUS
Qty: 1 | Refills: 1 | Status: ACTIVE | COMMUNITY
Start: 2025-06-27

## 2025-07-02 ENCOUNTER — NON-APPOINTMENT (OUTPATIENT)
Age: 50
End: 2025-07-02

## 2025-07-02 ENCOUNTER — OUTPATIENT (OUTPATIENT)
Dept: OUTPATIENT SERVICES | Facility: HOSPITAL | Age: 50
LOS: 1 days | End: 2025-07-02

## 2025-07-02 ENCOUNTER — APPOINTMENT (OUTPATIENT)
Dept: INTERNAL MEDICINE | Facility: CLINIC | Age: 50
End: 2025-07-02

## 2025-07-02 DIAGNOSIS — Z98.890 OTHER SPECIFIED POSTPROCEDURAL STATES: Chronic | ICD-10-CM

## 2025-07-07 ENCOUNTER — APPOINTMENT (OUTPATIENT)
Dept: NEPHROLOGY | Facility: CLINIC | Age: 50
End: 2025-07-07
Payer: COMMERCIAL

## 2025-07-07 VITALS
HEART RATE: 86 BPM | WEIGHT: 278.25 LBS | SYSTOLIC BLOOD PRESSURE: 143 MMHG | DIASTOLIC BLOOD PRESSURE: 90 MMHG | OXYGEN SATURATION: 99 % | BODY MASS INDEX: 44.91 KG/M2

## 2025-07-07 VITALS — DIASTOLIC BLOOD PRESSURE: 80 MMHG | HEART RATE: 70 BPM | SYSTOLIC BLOOD PRESSURE: 142 MMHG

## 2025-07-07 PROBLEM — E83.39 HYPERPHOSPHATEMIA: Status: ACTIVE | Noted: 2025-07-07

## 2025-07-07 LAB
24R-OH-CALCIDIOL SERPL-MCNC: 10.6 PG/ML
25(OH)D3 SERPL-MCNC: 22.3 NG/ML
ANION GAP SERPL CALC-SCNC: 19 MMOL/L
APPEARANCE: CLEAR
BILIRUBIN URINE: NEGATIVE
BLOOD URINE: ABNORMAL
BUN SERPL-MCNC: 77 MG/DL
CALCIUM SERPL-MCNC: 7.4 MG/DL
CHLORIDE SERPL-SCNC: 101 MMOL/L
CO2 SERPL-SCNC: 22 MMOL/L
COLOR: YELLOW
CREAT SERPL-MCNC: 6.54 MG/DL
EGFRCR SERPLBLD CKD-EPI 2021: 10 ML/MIN/1.73M2
EPO SERPL-MCNC: 19.5 MIU/ML
GLUCOSE QUALITATIVE U: 100 MG/DL
GLUCOSE SERPL-MCNC: 100 MG/DL
HCT VFR BLD CALC: 32.5 %
HGB BLD-MCNC: 10.1 G/DL
KETONES URINE: NEGATIVE MG/DL
LEUKOCYTE ESTERASE URINE: NEGATIVE
MCHC RBC-ENTMCNC: 28.8 PG
MCHC RBC-ENTMCNC: 31.1 G/DL
MCV RBC AUTO: 92.6 FL
NITRITE URINE: NEGATIVE
PH URINE: 8
PLATELET # BLD AUTO: 198 K/UL
POTASSIUM SERPL-SCNC: 4.8 MMOL/L
PROTEIN URINE: 300 MG/DL
RBC # BLD: 3.51 M/UL
RBC # FLD: 14.6 %
SODIUM SERPL-SCNC: 143 MMOL/L
SPECIFIC GRAVITY URINE: 1.01
UROBILINOGEN URINE: 0.2 MG/DL
WBC # FLD AUTO: 8.81 K/UL

## 2025-07-07 PROCEDURE — G2211 COMPLEX E/M VISIT ADD ON: CPT | Mod: NC

## 2025-07-07 PROCEDURE — 99205 OFFICE O/P NEW HI 60 MIN: CPT

## 2025-07-07 RX ORDER — SODIUM CITRATE AND CITRIC ACID MONOHYDRATE 334; 500 MG/5ML; MG/5ML
500-334 SOLUTION ORAL
Refills: 0 | Status: ACTIVE | COMMUNITY

## 2025-07-07 RX ORDER — SODIUM CITRATE DIHYDRATE 100 %
POWDER (GRAM) MISCELLANEOUS
Refills: 0 | Status: DISCONTINUED | COMMUNITY
Start: 2025-06-30 | End: 2025-07-07

## 2025-07-07 RX ORDER — CALCITRIOL 0.25 UG/1
0.25 CAPSULE, LIQUID FILLED ORAL
Qty: 90 | Refills: 3 | Status: ACTIVE | COMMUNITY
Start: 2025-07-07 | End: 1900-01-01

## 2025-07-07 RX ORDER — TORSEMIDE 20 MG/1
20 TABLET ORAL
Qty: 90 | Refills: 3 | Status: ACTIVE | COMMUNITY
Start: 2025-07-07 | End: 1900-01-01

## 2025-07-07 RX ORDER — TAMSULOSIN HYDROCHLORIDE 0.4 MG/1
0.4 CAPSULE ORAL
Refills: 0 | Status: ACTIVE | COMMUNITY

## 2025-07-07 RX ORDER — CALCIUM ACETATE 667 MG
667 TABLET ORAL
Qty: 270 | Refills: 3 | Status: ACTIVE | COMMUNITY
Start: 2025-07-07 | End: 1900-01-01

## 2025-07-09 ENCOUNTER — TRANSCRIPTION ENCOUNTER (OUTPATIENT)
Age: 50
End: 2025-07-09

## 2025-07-09 LAB
ALBUMIN SERPL ELPH-MCNC: 4.2 G/DL
ANION GAP SERPL CALC-SCNC: 22 MMOL/L
BASOPHILS # BLD AUTO: 0.04 K/UL
BASOPHILS NFR BLD AUTO: 0.4 %
BUN SERPL-MCNC: 80 MG/DL
CALCIUM SERPL-MCNC: 7.1 MG/DL
CHLORIDE SERPL-SCNC: 102 MMOL/L
CO2 SERPL-SCNC: 19 MMOL/L
CREAT SERPL-MCNC: 6.95 MG/DL
EGFRCR SERPLBLD CKD-EPI 2021: 9 ML/MIN/1.73M2
EOSINOPHIL # BLD AUTO: 0.1 K/UL
EOSINOPHIL NFR BLD AUTO: 1 %
FERRITIN SERPL-MCNC: 244 NG/ML
FOLATE SERPL-MCNC: 5.7 NG/ML
GLUCOSE SERPL-MCNC: 89 MG/DL
HBV CORE IGG+IGM SER QL: NONREACTIVE
HBV SURFACE AB SER QL: NONREACTIVE
HBV SURFACE AG SER QL: NONREACTIVE
HCT VFR BLD CALC: 32.9 %
HCV AB SER QL: NONREACTIVE
HCV S/CO RATIO: 0.11 S/CO
HGB BLD-MCNC: 10 G/DL
IMM GRANULOCYTES NFR BLD AUTO: 0.6 %
IRON SATN MFR SERPL: 19 %
IRON SERPL-MCNC: 56 UG/DL
LYMPHOCYTES # BLD AUTO: 2.19 K/UL
LYMPHOCYTES NFR BLD AUTO: 22.7 %
MAN DIFF?: NORMAL
MCHC RBC-ENTMCNC: 28.4 PG
MCHC RBC-ENTMCNC: 30.4 G/DL
MCV RBC AUTO: 93.5 FL
MONOCYTES # BLD AUTO: 0.6 K/UL
MONOCYTES NFR BLD AUTO: 6.2 %
NEUTROPHILS # BLD AUTO: 6.66 K/UL
NEUTROPHILS NFR BLD AUTO: 69.1 %
PHOSPHATE SERPL-MCNC: 6.2 MG/DL
PLATELET # BLD AUTO: 197 K/UL
POTASSIUM SERPL-SCNC: 5.5 MMOL/L
RBC # BLD: 3.52 M/UL
RBC # FLD: 14.5 %
SODIUM SERPL-SCNC: 142 MMOL/L
TIBC SERPL-MCNC: 301 UG/DL
UIBC SERPL-MCNC: 245 UG/DL
URATE SERPL-MCNC: 7.2 MG/DL
VIT B12 SERPL-MCNC: 819 PG/ML
WBC # FLD AUTO: 9.65 K/UL

## 2025-07-09 RX ORDER — SODIUM ZIRCONIUM CYCLOSILICATE 10 G/10G
10 POWDER, FOR SUSPENSION ORAL
Qty: 30 | Refills: 11 | Status: ACTIVE | COMMUNITY
Start: 2025-07-09 | End: 1900-01-01

## 2025-07-10 LAB
M TB IFN-G BLD-IMP: NEGATIVE
QUANTIFERON TB PLUS MITOGEN MINUS NIL: >10 IU/ML
QUANTIFERON TB PLUS NIL: 0.04 IU/ML
QUANTIFERON TB PLUS TB1 MINUS NIL: 0 IU/ML
QUANTIFERON TB PLUS TB2 MINUS NIL: 0 IU/ML

## 2025-07-15 ENCOUNTER — TRANSCRIPTION ENCOUNTER (OUTPATIENT)
Age: 50
End: 2025-07-15

## 2025-07-16 ENCOUNTER — APPOINTMENT (OUTPATIENT)
Dept: UROLOGY | Facility: CLINIC | Age: 50
End: 2025-07-16

## 2025-07-18 ENCOUNTER — APPOINTMENT (OUTPATIENT)
Dept: INTERNAL MEDICINE | Facility: CLINIC | Age: 50
End: 2025-07-18
Payer: COMMERCIAL

## 2025-07-18 PROCEDURE — 90746 HEPB VACCINE 3 DOSE ADULT IM: CPT

## 2025-07-18 PROCEDURE — G0010: CPT

## 2025-07-21 ENCOUNTER — TRANSCRIPTION ENCOUNTER (OUTPATIENT)
Age: 50
End: 2025-07-21

## 2025-07-21 DIAGNOSIS — R35.1 BENIGN PROSTATIC HYPERPLASIA WITH LOWER URINARY TRACT SYMPMS: ICD-10-CM

## 2025-07-21 DIAGNOSIS — N40.1 BENIGN PROSTATIC HYPERPLASIA WITH LOWER URINARY TRACT SYMPMS: ICD-10-CM

## 2025-07-25 ENCOUNTER — TRANSCRIPTION ENCOUNTER (OUTPATIENT)
Age: 50
End: 2025-07-25

## 2025-07-25 RX ORDER — SODIUM BICARBONATE 650 MG/1
650 TABLET ORAL
Qty: 1000 | Refills: 2 | Status: ACTIVE | COMMUNITY
Start: 2025-07-25 | End: 1900-01-01

## 2025-07-28 ENCOUNTER — APPOINTMENT (OUTPATIENT)
Dept: CARDIOLOGY | Facility: CLINIC | Age: 50
End: 2025-07-28
Payer: COMMERCIAL

## 2025-07-28 VITALS — WEIGHT: 254.19 LBS | BODY MASS INDEX: 41.03 KG/M2

## 2025-07-28 PROCEDURE — G2211 COMPLEX E/M VISIT ADD ON: CPT | Mod: NC,95

## 2025-07-28 PROCEDURE — 99213 OFFICE O/P EST LOW 20 MIN: CPT | Mod: 95

## 2025-07-30 ENCOUNTER — TRANSCRIPTION ENCOUNTER (OUTPATIENT)
Age: 50
End: 2025-07-30

## 2025-08-04 ENCOUNTER — APPOINTMENT (OUTPATIENT)
Dept: NEPHROLOGY | Facility: CLINIC | Age: 50
End: 2025-08-04
Payer: COMMERCIAL

## 2025-08-04 VITALS
WEIGHT: 253 LBS | OXYGEN SATURATION: 99 % | HEART RATE: 78 BPM | TEMPERATURE: 97 F | SYSTOLIC BLOOD PRESSURE: 158 MMHG | BODY MASS INDEX: 40.66 KG/M2 | HEIGHT: 66 IN | DIASTOLIC BLOOD PRESSURE: 98 MMHG

## 2025-08-04 VITALS — SYSTOLIC BLOOD PRESSURE: 144 MMHG | DIASTOLIC BLOOD PRESSURE: 84 MMHG

## 2025-08-04 DIAGNOSIS — E87.20 ACIDOSIS, UNSPECIFIED: ICD-10-CM

## 2025-08-04 DIAGNOSIS — D64.9 ANEMIA, UNSPECIFIED: ICD-10-CM

## 2025-08-04 DIAGNOSIS — I10 ESSENTIAL (PRIMARY) HYPERTENSION: ICD-10-CM

## 2025-08-04 DIAGNOSIS — R80.9 PROTEINURIA, UNSPECIFIED: ICD-10-CM

## 2025-08-04 PROCEDURE — G2211 COMPLEX E/M VISIT ADD ON: CPT | Mod: NC

## 2025-08-04 PROCEDURE — 99214 OFFICE O/P EST MOD 30 MIN: CPT

## 2025-08-06 DIAGNOSIS — N18.5 CHRONIC KIDNEY DISEASE, STAGE 5: ICD-10-CM

## 2025-08-06 LAB
ALBUMIN SERPL ELPH-MCNC: 4.5 G/DL
ALBUMIN, RANDOM URINE: 66.9 MG/DL
ANION GAP SERPL CALC-SCNC: 19 MMOL/L
BASOPHILS # BLD AUTO: 0.04 K/UL
BASOPHILS NFR BLD AUTO: 0.4 %
BUN SERPL-MCNC: 87 MG/DL
CALCIUM SERPL-MCNC: 9.1 MG/DL
CALCIUM SERPL-MCNC: 9.1 MG/DL
CHLORIDE SERPL-SCNC: 104 MMOL/L
CO2 SERPL-SCNC: 19 MMOL/L
CREAT SERPL-MCNC: 8 MG/DL
CREAT SPEC-SCNC: 40 MG/DL
EGFRCR SERPLBLD CKD-EPI 2021: 8 ML/MIN/1.73M2
EOSINOPHIL # BLD AUTO: 0.1 K/UL
EOSINOPHIL NFR BLD AUTO: 1 %
GLUCOSE SERPL-MCNC: 95 MG/DL
HCT VFR BLD CALC: 34.1 %
HGB BLD-MCNC: 10.2 G/DL
IMM GRANULOCYTES NFR BLD AUTO: 0.3 %
LYMPHOCYTES # BLD AUTO: 2.53 K/UL
LYMPHOCYTES NFR BLD AUTO: 24.8 %
MAN DIFF?: NORMAL
MCHC RBC-ENTMCNC: 27.5 PG
MCHC RBC-ENTMCNC: 29.9 G/DL
MCV RBC AUTO: 91.9 FL
MICROALBUMIN/CREAT 24H UR-RTO: 1677 MG/G
MONOCYTES # BLD AUTO: 0.53 K/UL
MONOCYTES NFR BLD AUTO: 5.2 %
NEUTROPHILS # BLD AUTO: 6.98 K/UL
NEUTROPHILS NFR BLD AUTO: 68.3 %
PARATHYROID HORMONE INTACT: 345 PG/ML
PHOSPHATE SERPL-MCNC: 5.5 MG/DL
PLATELET # BLD AUTO: 240 K/UL
POTASSIUM SERPL-SCNC: 4.8 MMOL/L
RBC # BLD: 3.71 M/UL
RBC # FLD: 13.6 %
SODIUM SERPL-SCNC: 142 MMOL/L
URATE SERPL-MCNC: 8.4 MG/DL
WBC # FLD AUTO: 10.21 K/UL

## 2025-08-18 PROBLEM — E66.01 MORBID OBESITY WITH BMI OF 40.0-44.9, ADULT: Status: ACTIVE | Noted: 2025-06-27

## 2025-08-19 ENCOUNTER — APPOINTMENT (OUTPATIENT)
Dept: VASCULAR SURGERY | Facility: CLINIC | Age: 50
End: 2025-08-19
Payer: COMMERCIAL

## 2025-08-19 VITALS — WEIGHT: 246 LBS | BODY MASS INDEX: 39.53 KG/M2 | HEIGHT: 66 IN

## 2025-08-19 PROCEDURE — 93986 DUP-SCAN HEMO COMPL UNI STD: CPT

## 2025-08-19 PROCEDURE — 99204 OFFICE O/P NEW MOD 45 MIN: CPT

## 2025-08-20 ENCOUNTER — NON-APPOINTMENT (OUTPATIENT)
Age: 50
End: 2025-08-20

## 2025-08-25 ENCOUNTER — APPOINTMENT (OUTPATIENT)
Dept: CARDIOLOGY | Facility: CLINIC | Age: 50
End: 2025-08-25
Payer: COMMERCIAL

## 2025-08-25 DIAGNOSIS — E66.01 MORBID (SEVERE) OBESITY DUE TO EXCESS CALORIES: ICD-10-CM

## 2025-08-25 PROCEDURE — 99213 OFFICE O/P EST LOW 20 MIN: CPT | Mod: 95

## 2025-08-25 PROCEDURE — G2211 COMPLEX E/M VISIT ADD ON: CPT | Mod: NC,95

## 2025-09-02 ENCOUNTER — APPOINTMENT (OUTPATIENT)
Dept: ORTHOPEDIC SURGERY | Facility: CLINIC | Age: 50
End: 2025-09-02

## 2025-09-02 VITALS — WEIGHT: 246 LBS | BODY MASS INDEX: 39.53 KG/M2 | HEIGHT: 66 IN

## 2025-09-02 DIAGNOSIS — S46.012A STRAIN OF MUSCLE(S) AND TENDON(S) OF THE ROTATOR CUFF OF LEFT SHOULDER, INITIAL ENCOUNTER: ICD-10-CM

## 2025-09-02 DIAGNOSIS — M25.512 PAIN IN LEFT SHOULDER: ICD-10-CM

## 2025-09-02 PROCEDURE — 99204 OFFICE O/P NEW MOD 45 MIN: CPT

## 2025-09-02 PROCEDURE — 72040 X-RAY EXAM NECK SPINE 2-3 VW: CPT

## 2025-09-02 PROCEDURE — 73030 X-RAY EXAM OF SHOULDER: CPT | Mod: LT

## 2025-09-04 ENCOUNTER — APPOINTMENT (OUTPATIENT)
Dept: MRI IMAGING | Facility: CLINIC | Age: 50
End: 2025-09-04

## 2025-09-08 ENCOUNTER — APPOINTMENT (OUTPATIENT)
Dept: NEPHROLOGY | Facility: CLINIC | Age: 50
End: 2025-09-08
Payer: COMMERCIAL

## 2025-09-08 VITALS — SYSTOLIC BLOOD PRESSURE: 140 MMHG | DIASTOLIC BLOOD PRESSURE: 80 MMHG

## 2025-09-08 VITALS
HEIGHT: 66 IN | DIASTOLIC BLOOD PRESSURE: 92 MMHG | TEMPERATURE: 97.9 F | OXYGEN SATURATION: 98 % | BODY MASS INDEX: 37.77 KG/M2 | WEIGHT: 235 LBS | HEART RATE: 72 BPM | SYSTOLIC BLOOD PRESSURE: 145 MMHG

## 2025-09-08 DIAGNOSIS — E87.20 ACIDOSIS, UNSPECIFIED: ICD-10-CM

## 2025-09-08 DIAGNOSIS — D64.9 ANEMIA, UNSPECIFIED: ICD-10-CM

## 2025-09-08 PROCEDURE — G2211 COMPLEX E/M VISIT ADD ON: CPT | Mod: NC

## 2025-09-08 PROCEDURE — 99214 OFFICE O/P EST MOD 30 MIN: CPT

## 2025-09-09 LAB
ALBUMIN SERPL ELPH-MCNC: 4.4 G/DL
ANION GAP SERPL CALC-SCNC: 18 MMOL/L
BASOPHILS # BLD AUTO: 0.05 K/UL
BASOPHILS NFR BLD AUTO: 0.5 %
BUN SERPL-MCNC: 88 MG/DL
CALCIUM SERPL-MCNC: 8.9 MG/DL
CHLORIDE SERPL-SCNC: 102 MMOL/L
CO2 SERPL-SCNC: 19 MMOL/L
CREAT SERPL-MCNC: 7.2 MG/DL
EGFRCR SERPLBLD CKD-EPI 2021: 9 ML/MIN/1.73M2
EOSINOPHIL # BLD AUTO: 0.11 K/UL
EOSINOPHIL NFR BLD AUTO: 1 %
GLUCOSE SERPL-MCNC: 90 MG/DL
HCT VFR BLD CALC: 36.1 %
HGB BLD-MCNC: 11.1 G/DL
IMM GRANULOCYTES NFR BLD AUTO: 0.5 %
LYMPHOCYTES # BLD AUTO: 2.39 K/UL
LYMPHOCYTES NFR BLD AUTO: 22.3 %
MAN DIFF?: NORMAL
MCHC RBC-ENTMCNC: 27.8 PG
MCHC RBC-ENTMCNC: 30.7 G/DL
MCV RBC AUTO: 90.3 FL
MONOCYTES # BLD AUTO: 0.66 K/UL
MONOCYTES NFR BLD AUTO: 6.2 %
NEUTROPHILS # BLD AUTO: 7.44 K/UL
NEUTROPHILS NFR BLD AUTO: 69.5 %
PHOSPHATE SERPL-MCNC: 5 MG/DL
PLATELET # BLD AUTO: 212 K/UL
POTASSIUM SERPL-SCNC: 5.3 MMOL/L
RBC # BLD: 4 M/UL
RBC # FLD: 14.2 %
SODIUM SERPL-SCNC: 139 MMOL/L
WBC # FLD AUTO: 10.7 K/UL

## 2025-09-15 ENCOUNTER — APPOINTMENT (OUTPATIENT)
Dept: ORTHOPEDIC SURGERY | Facility: CLINIC | Age: 50
End: 2025-09-15

## 2025-09-16 ENCOUNTER — APPOINTMENT (OUTPATIENT)
Dept: INTERNAL MEDICINE | Facility: CLINIC | Age: 50
End: 2025-09-16
Payer: COMMERCIAL

## 2025-09-16 VITALS
SYSTOLIC BLOOD PRESSURE: 170 MMHG | DIASTOLIC BLOOD PRESSURE: 99 MMHG | TEMPERATURE: 98 F | BODY MASS INDEX: 39.43 KG/M2 | OXYGEN SATURATION: 99 % | HEART RATE: 89 BPM | WEIGHT: 244.31 LBS

## 2025-09-16 DIAGNOSIS — N18.6 END STAGE RENAL DISEASE: ICD-10-CM

## 2025-09-16 DIAGNOSIS — Z23 ENCOUNTER FOR IMMUNIZATION: ICD-10-CM

## 2025-09-16 DIAGNOSIS — I10 ESSENTIAL (PRIMARY) HYPERTENSION: ICD-10-CM

## 2025-09-16 PROCEDURE — G0010: CPT

## 2025-09-16 PROCEDURE — 90746 HEPB VACCINE 3 DOSE ADULT IM: CPT

## 2025-09-16 PROCEDURE — G2211 COMPLEX E/M VISIT ADD ON: CPT | Mod: NC

## 2025-09-16 PROCEDURE — 36415 COLL VENOUS BLD VENIPUNCTURE: CPT

## 2025-09-16 PROCEDURE — 99213 OFFICE O/P EST LOW 20 MIN: CPT | Mod: 25

## 2025-09-17 ENCOUNTER — APPOINTMENT (OUTPATIENT)
Dept: CARDIOLOGY | Facility: CLINIC | Age: 50
End: 2025-09-17
Payer: COMMERCIAL

## 2025-09-17 VITALS
SYSTOLIC BLOOD PRESSURE: 160 MMHG | WEIGHT: 243 LBS | HEART RATE: 81 BPM | DIASTOLIC BLOOD PRESSURE: 90 MMHG | OXYGEN SATURATION: 97 % | BODY MASS INDEX: 39.22 KG/M2

## 2025-09-17 PROCEDURE — 99215 OFFICE O/P EST HI 40 MIN: CPT

## 2025-09-17 PROCEDURE — G2211 COMPLEX E/M VISIT ADD ON: CPT | Mod: NC

## 2025-09-18 ENCOUNTER — APPOINTMENT (OUTPATIENT)
Dept: INTERNAL MEDICINE | Facility: CLINIC | Age: 50
End: 2025-09-18